# Patient Record
Sex: MALE | Race: WHITE | Employment: OTHER | URBAN - NONMETROPOLITAN AREA
[De-identification: names, ages, dates, MRNs, and addresses within clinical notes are randomized per-mention and may not be internally consistent; named-entity substitution may affect disease eponyms.]

---

## 2017-03-23 ENCOUNTER — HOSPITAL ENCOUNTER (OUTPATIENT)
Dept: PHYSICAL THERAPY | Age: 67
Setting detail: THERAPIES SERIES
Discharge: HOME | End: 2017-03-23
Payer: COMMERCIAL

## 2017-03-23 PROCEDURE — 97140 MANUAL THERAPY 1/> REGIONS: CPT

## 2017-03-23 PROCEDURE — 97163 PT EVAL HIGH COMPLEX 45 MIN: CPT

## 2017-03-23 PROCEDURE — 97110 THERAPEUTIC EXERCISES: CPT

## 2017-03-27 ENCOUNTER — HOSPITAL ENCOUNTER (OUTPATIENT)
Dept: PHYSICAL THERAPY | Age: 67
Setting detail: THERAPIES SERIES
Discharge: HOME | End: 2017-03-27
Payer: COMMERCIAL

## 2017-03-27 ENCOUNTER — HOSPITAL ENCOUNTER (OUTPATIENT)
Age: 67
Discharge: HOME | End: 2017-03-27
Payer: COMMERCIAL

## 2017-03-27 ENCOUNTER — HOSPITAL ENCOUNTER (OUTPATIENT)
Dept: GENERAL RADIOLOGY | Age: 67
Discharge: HOME | End: 2017-03-27
Payer: COMMERCIAL

## 2017-03-27 DIAGNOSIS — M54.2 NECK PAIN: ICD-10-CM

## 2017-03-27 PROCEDURE — 97110 THERAPEUTIC EXERCISES: CPT

## 2017-03-27 PROCEDURE — 97140 MANUAL THERAPY 1/> REGIONS: CPT

## 2017-03-27 PROCEDURE — 72040 X-RAY EXAM NECK SPINE 2-3 VW: CPT

## 2017-03-27 ASSESSMENT — PAIN SCALES - GENERAL: PAINLEVEL_OUTOF10: 2

## 2017-03-30 ENCOUNTER — HOSPITAL ENCOUNTER (OUTPATIENT)
Dept: PHYSICAL THERAPY | Age: 67
Setting detail: THERAPIES SERIES
Discharge: HOME | End: 2017-03-30
Payer: COMMERCIAL

## 2017-03-30 PROCEDURE — 97110 THERAPEUTIC EXERCISES: CPT

## 2017-03-30 PROCEDURE — 97140 MANUAL THERAPY 1/> REGIONS: CPT

## 2017-04-03 ENCOUNTER — HOSPITAL ENCOUNTER (OUTPATIENT)
Dept: PHYSICAL THERAPY | Age: 67
Setting detail: THERAPIES SERIES
Discharge: HOME | End: 2017-04-03
Payer: COMMERCIAL

## 2017-04-03 PROCEDURE — 97110 THERAPEUTIC EXERCISES: CPT

## 2017-04-03 PROCEDURE — 97140 MANUAL THERAPY 1/> REGIONS: CPT

## 2017-04-03 ASSESSMENT — PAIN SCALES - GENERAL: PAINLEVEL_OUTOF10: 3

## 2017-04-05 ENCOUNTER — HOSPITAL ENCOUNTER (OUTPATIENT)
Dept: PHYSICAL THERAPY | Age: 67
Setting detail: THERAPIES SERIES
Discharge: HOME | End: 2017-04-05
Payer: COMMERCIAL

## 2017-04-05 PROCEDURE — 97140 MANUAL THERAPY 1/> REGIONS: CPT

## 2017-04-05 PROCEDURE — 97110 THERAPEUTIC EXERCISES: CPT

## 2017-04-05 PROCEDURE — G0283 ELEC STIM OTHER THAN WOUND: HCPCS

## 2017-04-07 ENCOUNTER — HOSPITAL ENCOUNTER (OUTPATIENT)
Dept: PHYSICAL THERAPY | Age: 67
Setting detail: THERAPIES SERIES
Discharge: HOME | End: 2017-04-07
Payer: COMMERCIAL

## 2017-04-07 PROCEDURE — 97140 MANUAL THERAPY 1/> REGIONS: CPT

## 2017-04-07 PROCEDURE — 97110 THERAPEUTIC EXERCISES: CPT

## 2017-04-10 ENCOUNTER — HOSPITAL ENCOUNTER (OUTPATIENT)
Dept: PHYSICAL THERAPY | Age: 67
Setting detail: THERAPIES SERIES
Discharge: HOME | End: 2017-04-10
Payer: COMMERCIAL

## 2017-04-10 PROCEDURE — 97140 MANUAL THERAPY 1/> REGIONS: CPT

## 2017-04-10 PROCEDURE — 97110 THERAPEUTIC EXERCISES: CPT

## 2017-04-10 ASSESSMENT — PAIN SCALES - GENERAL: PAINLEVEL_OUTOF10: 2

## 2017-04-12 ENCOUNTER — HOSPITAL ENCOUNTER (OUTPATIENT)
Dept: PHYSICAL THERAPY | Age: 67
Setting detail: THERAPIES SERIES
Discharge: HOME | End: 2017-04-12
Payer: COMMERCIAL

## 2017-04-12 PROCEDURE — 97140 MANUAL THERAPY 1/> REGIONS: CPT

## 2017-04-12 PROCEDURE — 97110 THERAPEUTIC EXERCISES: CPT

## 2017-04-12 ASSESSMENT — PAIN SCALES - GENERAL: PAINLEVEL_OUTOF10: 2

## 2017-04-14 ENCOUNTER — HOSPITAL ENCOUNTER (OUTPATIENT)
Dept: PHYSICAL THERAPY | Age: 67
Setting detail: THERAPIES SERIES
Discharge: HOME | End: 2017-04-14
Payer: COMMERCIAL

## 2017-04-14 PROCEDURE — 97140 MANUAL THERAPY 1/> REGIONS: CPT

## 2017-04-14 PROCEDURE — 97110 THERAPEUTIC EXERCISES: CPT

## 2017-04-17 ENCOUNTER — HOSPITAL ENCOUNTER (OUTPATIENT)
Dept: PHYSICAL THERAPY | Age: 67
Setting detail: THERAPIES SERIES
Discharge: HOME | End: 2017-04-17
Payer: COMMERCIAL

## 2017-04-17 PROCEDURE — 97110 THERAPEUTIC EXERCISES: CPT

## 2017-04-17 PROCEDURE — 97140 MANUAL THERAPY 1/> REGIONS: CPT

## 2017-04-19 ENCOUNTER — HOSPITAL ENCOUNTER (OUTPATIENT)
Dept: PHYSICAL THERAPY | Age: 67
Setting detail: THERAPIES SERIES
Discharge: HOME | End: 2017-04-19
Payer: COMMERCIAL

## 2017-04-19 PROCEDURE — 97140 MANUAL THERAPY 1/> REGIONS: CPT

## 2017-04-19 PROCEDURE — 97110 THERAPEUTIC EXERCISES: CPT

## 2017-04-19 ASSESSMENT — PAIN SCALES - GENERAL: PAINLEVEL_OUTOF10: 2

## 2017-04-21 ENCOUNTER — HOSPITAL ENCOUNTER (OUTPATIENT)
Dept: PHYSICAL THERAPY | Age: 67
Setting detail: THERAPIES SERIES
Discharge: HOME | End: 2017-04-21
Payer: COMMERCIAL

## 2017-04-21 PROCEDURE — 97110 THERAPEUTIC EXERCISES: CPT

## 2017-04-24 ENCOUNTER — HOSPITAL ENCOUNTER (OUTPATIENT)
Dept: PHYSICAL THERAPY | Age: 67
Setting detail: THERAPIES SERIES
Discharge: HOME | End: 2017-04-24
Payer: COMMERCIAL

## 2017-04-24 PROCEDURE — 97110 THERAPEUTIC EXERCISES: CPT

## 2017-04-24 PROCEDURE — 97140 MANUAL THERAPY 1/> REGIONS: CPT

## 2017-04-24 ASSESSMENT — PAIN SCALES - GENERAL: PAINLEVEL_OUTOF10: 2

## 2017-04-26 ENCOUNTER — HOSPITAL ENCOUNTER (OUTPATIENT)
Dept: PHYSICAL THERAPY | Age: 67
Setting detail: THERAPIES SERIES
Discharge: HOME | End: 2017-04-26
Payer: COMMERCIAL

## 2017-04-26 PROCEDURE — 97140 MANUAL THERAPY 1/> REGIONS: CPT

## 2017-04-26 PROCEDURE — 97110 THERAPEUTIC EXERCISES: CPT

## 2017-04-26 ASSESSMENT — PAIN SCALES - GENERAL
PAINLEVEL_OUTOF10: 2
PAINLEVEL_OUTOF10: 2

## 2017-04-28 ENCOUNTER — APPOINTMENT (OUTPATIENT)
Dept: PHYSICAL THERAPY | Age: 67
End: 2017-04-28
Payer: COMMERCIAL

## 2017-05-01 ENCOUNTER — HOSPITAL ENCOUNTER (OUTPATIENT)
Dept: PHYSICAL THERAPY | Age: 67
Setting detail: THERAPIES SERIES
Discharge: HOME | End: 2017-05-01
Payer: COMMERCIAL

## 2017-05-01 PROCEDURE — 97110 THERAPEUTIC EXERCISES: CPT

## 2017-05-01 PROCEDURE — 97140 MANUAL THERAPY 1/> REGIONS: CPT

## 2017-05-03 ENCOUNTER — HOSPITAL ENCOUNTER (OUTPATIENT)
Dept: PHYSICAL THERAPY | Age: 67
Setting detail: THERAPIES SERIES
Discharge: HOME | End: 2017-05-03
Payer: COMMERCIAL

## 2017-05-03 PROCEDURE — 97110 THERAPEUTIC EXERCISES: CPT

## 2017-05-03 PROCEDURE — 97140 MANUAL THERAPY 1/> REGIONS: CPT

## 2017-05-03 ASSESSMENT — PAIN SCALES - GENERAL: PAINLEVEL_OUTOF10: 2

## 2017-05-05 ENCOUNTER — APPOINTMENT (OUTPATIENT)
Dept: PHYSICAL THERAPY | Age: 67
End: 2017-05-05
Payer: COMMERCIAL

## 2017-05-08 ENCOUNTER — HOSPITAL ENCOUNTER (OUTPATIENT)
Dept: PHYSICAL THERAPY | Age: 67
Setting detail: THERAPIES SERIES
Discharge: HOME | End: 2017-05-08
Payer: COMMERCIAL

## 2017-05-10 ENCOUNTER — APPOINTMENT (OUTPATIENT)
Dept: PHYSICAL THERAPY | Age: 67
End: 2017-05-10
Payer: COMMERCIAL

## 2017-05-19 ENCOUNTER — HOSPITAL ENCOUNTER (OUTPATIENT)
Dept: PHYSICAL THERAPY | Age: 67
Discharge: HOME | End: 2017-05-19

## 2017-12-27 NOTE — DISCHARGE SUMMARY
Phone: Dian          Fax: [phone]                            Outpatient Physical Therapy                                                                    Discharge Summary    Patient: Claudette Zuniga  : 1950  CSN #: [id number]   Referring physician: No admitting provider for patient encounter. Referring Practitioner: Omar Martinez, [facility]      Diagnosis: Cervicalgia, cervical radiculopathy      Date Treatment Initiated: 3/23/17  Date of Last Treatment: 5/3/17      PT Visit Information  Onset Date: 17  PT Insurance Information: R  Total # of Visits Approved: 18  Total # of Visits to Date: 15  Plan of Care/Certification Expiration Date: 17  No Show: 0  Canceled Appointment: 0      Frequency/Duration   3 times per week   6 weeks      Treatment Received  [x]HP/CP      []Electrical Stim   [x]Therapeutic Exercise      []Gait Training  []Aquatics   []Ultrasound         [x]Patient Education/HEP   [x]Manual Therapy  []Traction    []Neuro-shira        [x]Soft Tissue Mobs            []Home TENS  []Iontophoresis    []Orthotic casting/fitting      []Dry Needling    Assessment  Assessment: Pt completed 15 PT sessions for neck pain. At time of last visit, pain 1-2/10. UE strength was 4+/5. Pt was compliant with home program and scheduled no further visits; we will now discharge PT episode. Goals  Short term goals  Time Frame for Short term goals: 3 weeks  Short term goal 1: Pt to be instructed in home program. - met  Short term goal 2: Pt to initiated on postural strengthening program to promote proper posture and limit forward head. - met    Long term goals  Time Frame for Long term goals : 6 weeks  Long term goal 1: Pt to report independence and compliance with home program.   Long term goal 2: Pt to achieve 60 degrees of bilateral c-spine rotation to assist with activities such as driving.    Long term goal 3: Pt to demonstrate 4+/5 strength bilateral UE's to assist with lifting and carrying. Long term goal 4: Pt to report decrease UE sx's by 50-75% throughout the day.        Reason for Discharge  [x] Goals Achieved                       [] Poor Follow Through/Attendance                  [x] Optimal Function Achieved     [] Patient Discharged Self    [] Hospitalization                         [] Physician discharge      Thank you for this referral      Aditi Chun, PT, DPT                    Date: 6/27/2017

## 2018-01-16 ENCOUNTER — HOSPITAL ENCOUNTER (OUTPATIENT)
Dept: MRI IMAGING | Age: 68
Discharge: HOME | End: 2018-01-16
Payer: MEDICARE

## 2018-01-16 ENCOUNTER — HOSPITAL ENCOUNTER (OUTPATIENT)
Dept: LAB | Age: 68
Discharge: HOME | End: 2018-01-16
Payer: MEDICARE

## 2018-01-16 DIAGNOSIS — H91.90 HEARING LOSS, UNSPECIFIED HEARING LOSS TYPE, UNSPECIFIED LATERALITY: ICD-10-CM

## 2018-01-16 DIAGNOSIS — Z01.812 PRE-PROCEDURE LAB EXAM: ICD-10-CM

## 2018-01-16 DIAGNOSIS — E78.5 HYPERLIPIDEMIA, UNSPECIFIED HYPERLIPIDEMIA TYPE: ICD-10-CM

## 2018-01-16 DIAGNOSIS — Z12.5 SCREENING PSA (PROSTATE SPECIFIC ANTIGEN): ICD-10-CM

## 2018-01-16 LAB
BUN BLDV-MCNC: 15 MG/DL (ref 8–23)
CHOLESTEROL, FASTING: 167 MG/DL
CHOLESTEROL/HDL RATIO: 4
CREAT SERPL-MCNC: 0.93 MG/DL (ref 0.7–1.2)
GFR AFRICAN AMERICAN: >60 ML/MIN
GFR NON-AFRICAN AMERICAN: >60 ML/MIN
GFR SERPL CREATININE-BSD FRML MDRD: NORMAL ML/MIN/{1.73_M2}
GFR SERPL CREATININE-BSD FRML MDRD: NORMAL ML/MIN/{1.73_M2}
HDLC SERPL-MCNC: 42 MG/DL
LDL CHOLESTEROL: 97 MG/DL (ref 0–130)
PROSTATE SPECIFIC ANTIGEN: 0.54 UG/L
TRIGLYCERIDE, FASTING: 141 MG/DL
VLDLC SERPL CALC-MCNC: NORMAL MG/DL (ref 1–30)

## 2018-01-16 PROCEDURE — 84520 ASSAY OF UREA NITROGEN: CPT

## 2018-01-16 PROCEDURE — A9579 GAD-BASE MR CONTRAST NOS,1ML: HCPCS | Performed by: INTERNAL MEDICINE

## 2018-01-16 PROCEDURE — 80061 LIPID PANEL: CPT

## 2018-01-16 PROCEDURE — G0103 PSA SCREENING: HCPCS

## 2018-01-16 PROCEDURE — 6360000004 HC RX CONTRAST MEDICATION: Performed by: INTERNAL MEDICINE

## 2018-01-16 PROCEDURE — 70553 MRI BRAIN STEM W/O & W/DYE: CPT

## 2018-01-16 PROCEDURE — 82565 ASSAY OF CREATININE: CPT

## 2018-01-16 PROCEDURE — 36415 COLL VENOUS BLD VENIPUNCTURE: CPT

## 2018-01-16 RX ADMIN — GADOTERIDOL 18 ML: 279.3 INJECTION, SOLUTION INTRAVENOUS at 09:01

## 2020-05-05 PROBLEM — E66.01 MORBIDLY OBESE (HCC): Status: ACTIVE | Noted: 2020-05-05

## 2022-04-22 PROBLEM — N52.9 IMPOTENCE OF ORGANIC ORIGIN: Status: ACTIVE | Noted: 2022-04-22

## 2022-04-22 PROBLEM — N30.10 CHRONIC INTERSTITIAL CYSTITIS: Status: ACTIVE | Noted: 2022-04-22

## 2022-04-22 PROBLEM — N40.0 ENLARGED PROSTATE: Status: ACTIVE | Noted: 2022-04-22

## 2022-04-26 ENCOUNTER — HOSPITAL ENCOUNTER (OUTPATIENT)
Age: 72
Discharge: HOME | End: 2022-04-26
Payer: MEDICARE

## 2022-04-26 DIAGNOSIS — G47.00 INSOMNIA, UNSPECIFIED TYPE: ICD-10-CM

## 2022-04-26 DIAGNOSIS — I10 ESSENTIAL HYPERTENSION: ICD-10-CM

## 2022-04-26 DIAGNOSIS — R79.9 ABNORMAL FINDING OF BLOOD CHEMISTRY, UNSPECIFIED: ICD-10-CM

## 2022-04-26 DIAGNOSIS — E78.5 HYPERLIPIDEMIA, UNSPECIFIED HYPERLIPIDEMIA TYPE: ICD-10-CM

## 2022-04-26 LAB
AMPHETAMINE SCREEN URINE: NEGATIVE
BARBITURATE SCREEN URINE: NEGATIVE
BENZODIAZEPINE SCREEN, URINE: NEGATIVE
BUPRENORPHINE URINE: NEGATIVE
CANNABINOID SCREEN URINE: NEGATIVE
COCAINE METABOLITE, URINE: NEGATIVE
METHADONE SCREEN, URINE: NEGATIVE
METHAMPHETAMINE, URINE: NEGATIVE
OPIATES, URINE: NEGATIVE
OXYCODONE SCREEN URINE: NEGATIVE
PHENCYCLIDINE, URINE: NEGATIVE
PROPOXYPHENE, URINE: NEGATIVE
TRICYCLIC ANTIDEPRESSANTS, UR: NEGATIVE

## 2022-04-26 PROCEDURE — 80306 DRUG TEST PRSMV INSTRMNT: CPT

## 2022-06-02 ENCOUNTER — TELEPHONE (OUTPATIENT)
Dept: SURGERY | Age: 72
End: 2022-06-02

## 2022-06-02 DIAGNOSIS — Z12.11 SCREEN FOR COLON CANCER: Primary | ICD-10-CM

## 2022-10-24 ENCOUNTER — TELEPHONE (OUTPATIENT)
Dept: GASTROENTEROLOGY | Age: 72
End: 2022-10-24

## 2022-10-24 ENCOUNTER — OFFICE VISIT (OUTPATIENT)
Dept: GASTROENTEROLOGY | Age: 72
End: 2022-10-24
Payer: MEDICARE

## 2022-10-24 VITALS
WEIGHT: 210.8 LBS | DIASTOLIC BLOOD PRESSURE: 78 MMHG | BODY MASS INDEX: 35.99 KG/M2 | TEMPERATURE: 97.7 F | RESPIRATION RATE: 18 BRPM | HEART RATE: 76 BPM | HEIGHT: 64 IN | SYSTOLIC BLOOD PRESSURE: 132 MMHG

## 2022-10-24 DIAGNOSIS — K21.9 GASTROESOPHAGEAL REFLUX DISEASE WITHOUT ESOPHAGITIS: ICD-10-CM

## 2022-10-24 DIAGNOSIS — Z01.818 PRE-OP TESTING: Primary | ICD-10-CM

## 2022-10-24 DIAGNOSIS — Z12.11 COLON CANCER SCREENING: Primary | ICD-10-CM

## 2022-10-24 PROCEDURE — G8427 DOCREV CUR MEDS BY ELIG CLIN: HCPCS | Performed by: INTERNAL MEDICINE

## 2022-10-24 PROCEDURE — 1036F TOBACCO NON-USER: CPT | Performed by: INTERNAL MEDICINE

## 2022-10-24 PROCEDURE — 99202 OFFICE O/P NEW SF 15 MIN: CPT | Performed by: INTERNAL MEDICINE

## 2022-10-24 PROCEDURE — G8417 CALC BMI ABV UP PARAM F/U: HCPCS | Performed by: INTERNAL MEDICINE

## 2022-10-24 PROCEDURE — 3017F COLORECTAL CA SCREEN DOC REV: CPT | Performed by: INTERNAL MEDICINE

## 2022-10-24 PROCEDURE — G8484 FLU IMMUNIZE NO ADMIN: HCPCS | Performed by: INTERNAL MEDICINE

## 2022-10-24 PROCEDURE — 1123F ACP DISCUSS/DSCN MKR DOCD: CPT | Performed by: INTERNAL MEDICINE

## 2022-10-24 PROCEDURE — 99215 OFFICE O/P EST HI 40 MIN: CPT | Performed by: INTERNAL MEDICINE

## 2022-10-24 RX ORDER — POLYETHYLENE GLYCOL 3350, SODIUM CHLORIDE, SODIUM BICARBONATE, POTASSIUM CHLORIDE 420; 11.2; 5.72; 1.48 G/4L; G/4L; G/4L; G/4L
4000 POWDER, FOR SOLUTION ORAL ONCE
Qty: 4000 ML | Refills: 0 | Status: SHIPPED | OUTPATIENT
Start: 2022-10-24 | End: 2022-10-24

## 2022-10-24 ASSESSMENT — ENCOUNTER SYMPTOMS
RESPIRATORY NEGATIVE: 1
GASTROINTESTINAL NEGATIVE: 1
ROS SKIN COMMENTS: BASAL CELL CARCINOMA
EYE REDNESS: 0

## 2022-10-24 NOTE — PROGRESS NOTES
[address]  [address]    No chief complaint on file. HPI    Mr. Meme Chester is a 81year old man with a past medical history of hypertension, hyperlipidemia, acid reflux who presents for colon cancer screening. His last EGD and colonoscopy was in 06/2012 with findings of  hyperplastic polyps and recommendations for follow-up in 10 years. Family history of colon cancer: Mother in her [de-identified]  Blood in stool: No  Unintentional weight loss: No  Abdominal pain: No  Prior colonoscopy: Yes  Constipation history: No  Number of bowel movements a day:1-2  Change in stool caliber: No    EGD/Colonoscopy 06/25/2012  A. DUODENAL BULB, BIOPSY:            -  DUODENAL MUCOSA WITH TRANSITIONAL ZONE                  EPITHELIUM WITH MODERATE CHRONIC INFLAMMATION. B.     SIGMOID POLYP:            -  HYPERPLASTIC POLYP OF LARGE INTESTINE. C.     RECTAL POLYP:            -  HYPERPLASTIC POLYP OF LARGE INTESTINE. Past Medical History:   Diagnosis Date    Cancer ([facility] [address]     skin    GERD (gastroesophageal reflux disease)     Hearing loss     High cholesterol     Hypertension     Stroke [facility])     Unspecified cerebral artery occlusion with cerebral infarction          Past Surgical History:   Procedure Laterality Date    PROSTATE SURGERY      SKIN CANCER DESTRUCTION  2008    SKIN CANCER EXCISION  2008         Current Outpatient Medications   Medication Sig Dispense Refill    losartan (COZAAR) 100 MG tablet TAKE 1 TABLET EVERY DAY 90 tablet 1    amLODIPine (NORVASC) 10 MG tablet TAKE 1 TABLET EVERY DAY 90 tablet 1    atorvastatin (LIPITOR) 20 MG tablet TAKE 1 TABLET EVERY DAY 90 tablet 1    aspirin 81 MG tablet Take 81 mg by mouth daily. dutasteride (AVODART) 0.5 MG capsule Take 0.5 mg by mouth three times a week. No current facility-administered medications for this visit.          Family History   Problem Relation Age of Onset    Colon Cancer Mother     Alzheimer's Disease Mother Heart Disease Father     Heart Disease Brother     Alzheimer's Disease Maternal Grandmother     Cancer Maternal Grandfather         prostate          Social Determinants of Health     Tobacco Use: Medium Risk    Smoking Tobacco Use: Former    Smokeless Tobacco Use: Former    Passive Exposure: Not on file   Alcohol Use: Not At Risk    Frequency of Alcohol Consumption: 4 or more times a week    Average Number of Drinks: 1 or 2    Frequency of Binge Drinking: Never   Financial Resource Strain: Low Risk     Difficulty of Paying Living Expenses: Not hard at all   Food Insecurity: No Food Insecurity    Worried About Running Out of Food in the Last Year: Never true    [address] in the Last Year: Never true   Transportation Needs: No Transportation Needs    Lack of Transportation (Medical): No    Lack of Transportation (Non-Medical): No   Physical Activity: Insufficiently Active    Days of Exercise per Week: 4 days    Minutes of Exercise per Session: 30 min   Stress: No Stress Concern Present    Feeling of Stress : Not at all   Social Connections: Socially Integrated    Frequency of Communication with Friends and Family: Three times a week    Frequency of Social Gatherings with Friends and Family: More than three times a week    Attends [employer] Services: More than 4 times per year    Active Member of [employer] or Organizations: Yes    Attends Club or Organization Meetings: More than 4 times per year    Marital Status:    Intimate Partner Violence: Not At Risk    Fear of Current or Ex-Partner: No    Emotionally Abused: No    Physically Abused: No    Sexually Abused: No   Depression: Not at risk    PHQ-2 Score: 0   Housing Stability: Not on file       Review of Systems   Constitutional: Negative. HENT: Negative. Eyes:  Negative for redness. Respiratory: Negative. Cardiovascular:         Hypertension   Gastrointestinal: Negative. Endocrine:        Hyperlipidemia   Genitourinary: Negative. BPH     Musculoskeletal: Negative. Skin:         Basal cell carcinoma   Neurological: Negative. Hematological: Negative. Psychiatric/Behavioral: Negative. Vitals:    10/24/22 0829   BP: 132/78   Pulse: 76   Resp: 18   Temp: 97.7 °F (36.5 °C)         Physical Exam  Constitutional:       Appearance: Normal appearance. HENT:      Head: Normocephalic and atraumatic. Right Ear: External ear normal.      Left Ear: External ear normal.      Nose: Nose normal.      Mouth/Throat:      Mouth: Mucous membranes are moist.   Eyes:      General: No scleral icterus. Extraocular Movements: Extraocular movements intact. Pupils: Pupils are equal, round, and reactive to light. Cardiovascular:      Rate and Rhythm: Normal rate and regular rhythm. Pulses: Normal pulses. Heart sounds: Normal heart sounds. Pulmonary:      Effort: Pulmonary effort is normal.      Breath sounds: Normal breath sounds. Abdominal:      General: Bowel sounds are normal.      Palpations: Abdomen is soft. Musculoskeletal:         General: Normal range of motion. Cervical back: Normal range of motion and neck supple. Skin:     General: Skin is warm. Neurological:      General: No focal deficit present. Mental Status: He is alert.    Psychiatric:         Mood and Affect: Mood normal.       Lab Results   Component Value Date    WBC 6.8 05/04/2022    HGB 15.3 05/04/2022    HCT 44.4 05/04/2022    MCV 90 05/04/2022     05/04/2022        Lab Results   Component Value Date     05/04/2022    K 4.0 05/04/2022     05/04/2022    CO2 28 05/04/2022    BUN 18 05/04/2022    CREATININE 0.96 05/04/2022    GLUCOSE 101 (H) 05/04/2022    CALCIUM 9.6 05/04/2022    PROT 7.1 05/04/2022    LABALBU 4.5 05/04/2022    BILITOT 0.5 05/04/2022    ALKPHOS 42 05/04/2022    AST 31 05/04/2022    ALT 47 (H) 05/04/2022    LABGLOM >60 01/16/2018    GFRAA >60 01/16/2018        No results found for: INR, PROTIME      Assessment    Mr. Ebony Alicea is a 81year old man with a PMH of hypertension, hyperlipidemia, acid reflux who presents for colon cancer screening. His last EGD and colonoscopy was in 06/2012 with findings of  hyperplastic polyps and recommendations for follow-up in 10 years. ASA 2, Mallampati score 2. Plan    1. Colon cancer screening  - COLONOSCOPY (Screening); Future  - EGD; Future  - polyethylene glycol-electrolytes (NULYTELY) 420 g solution; Take 4,000 mLs by mouth once for 1 dose  Dispense: 4000 mL; Refill: 0    2. Gastroesophageal reflux disease without esophagitis  - COLONOSCOPY (Screening); Future  - EGD; Future  - polyethylene glycol-electrolytes (NULYTELY) 420 g solution; Take 4,000 mLs by mouth once for 1 dose  Dispense: 4000 mL; Refill: 0    3. Additional recommendations based on findings. Informed consent was obtained with a discussion about potential risks and complications of the procedure. Patient verbalized understanding and willingness to continue with the procedure scheduling. Spent 20 minutes with the patient with greater than 50 percent of the time was spent on face-to-face time in discussion with the patient regarding diagnostic options/results, treatment options, counseling, and follow-up plan.       Elier Pearson MD

## 2022-10-24 NOTE — PATIENT INSTRUCTIONS
SURVEY:    You may be receiving a survey from [employer] regarding your visit today. Please complete the survey to enable us to provide the highest quality of care to you and your family. If you cannot score us a very good on any question, please call the office to discuss how we could have made your experience a very good one. Thank you.

## 2022-10-28 ENCOUNTER — HOSPITAL ENCOUNTER (OUTPATIENT)
Age: 72
Setting detail: SPECIMEN
Discharge: HOME | End: 2022-10-28

## 2022-10-28 DIAGNOSIS — I10 ESSENTIAL HYPERTENSION: ICD-10-CM

## 2022-10-28 LAB
ALBUMIN SERPL-MCNC: 4.6 G/DL (ref 3.5–5.2)
ALBUMIN/GLOBULIN RATIO: 1.6 (ref 1–2.5)
ALP BLD-CCNC: 46 U/L (ref 40–129)
ALT SERPL-CCNC: 43 U/L (ref 5–41)
ANION GAP SERPL CALCULATED.3IONS-SCNC: 13 MMOL/L (ref 9–17)
AST SERPL-CCNC: 32 U/L
BILIRUB SERPL-MCNC: 0.3 MG/DL (ref 0.3–1.2)
BUN BLDV-MCNC: 11 MG/DL (ref 8–23)
CALCIUM SERPL-MCNC: 9.8 MG/DL (ref 8.6–10.4)
CHLORIDE BLD-SCNC: 100 MMOL/L (ref 98–107)
CO2: 27 MMOL/L (ref 20–31)
CREAT SERPL-MCNC: 0.86 MG/DL (ref 0.7–1.2)
GFR SERPL CREATININE-BSD FRML MDRD: >60 ML/MIN/1.73M2
GLUCOSE BLD-MCNC: 104 MG/DL (ref 70–99)
POTASSIUM SERPL-SCNC: 4.2 MMOL/L (ref 3.7–5.3)
SODIUM BLD-SCNC: 140 MMOL/L (ref 135–144)
TOTAL PROTEIN: 7.5 G/DL (ref 6.4–8.3)

## 2023-02-23 RX ORDER — M-VIT,TX,IRON,MINS/CALC/FOLIC 27MG-0.4MG
1 TABLET ORAL DAILY
COMMUNITY

## 2023-02-23 NOTE — PROGRESS NOTES
Patient instructed on the pre-operative, intra-operative, and post-operative process. Patient's surgical procedure and day of surgery confirmed. Patient instructed on NPO status. Medication instructions reviewed with patient per PAT phone interview.

## 2023-02-23 NOTE — PROGRESS NOTES
Patient instructed to contact [facility] office regarding not having received colonoscopy prep instructions; instructed patient to have ordered EKG done as soon as possible, patient states he will have EKG done on March 1st.

## 2023-03-01 ENCOUNTER — HOSPITAL ENCOUNTER (OUTPATIENT)
Age: 73
Discharge: HOME | End: 2023-03-01
Payer: MEDICARE

## 2023-03-01 ENCOUNTER — ANESTHESIA EVENT (OUTPATIENT)
Dept: OPERATING ROOM | Age: 73
End: 2023-03-01
Payer: MEDICARE

## 2023-03-01 DIAGNOSIS — Z01.818 PRE-OP TESTING: ICD-10-CM

## 2023-03-01 PROCEDURE — 93005 ELECTROCARDIOGRAM TRACING: CPT

## 2023-03-02 LAB
EKG ATRIAL RATE: 72 BPM
EKG P AXIS: 69 DEGREES
EKG P-R INTERVAL: 212 MS
EKG Q-T INTERVAL: 384 MS
EKG QRS DURATION: 84 MS
EKG QTC CALCULATION (BAZETT): 420 MS
EKG R AXIS: -10 DEGREES
EKG T AXIS: 32 DEGREES
EKG VENTRICULAR RATE: 72 BPM

## 2023-03-08 ENCOUNTER — ANESTHESIA (OUTPATIENT)
Dept: OPERATING ROOM | Age: 73
End: 2023-03-08
Payer: MEDICARE

## 2023-03-08 ENCOUNTER — HOSPITAL ENCOUNTER (OUTPATIENT)
Age: 73
Setting detail: OBSERVATION
Discharge: HOME | End: 2023-03-09
Attending: INTERNAL MEDICINE | Admitting: INTERNAL MEDICINE
Payer: MEDICARE

## 2023-03-08 DIAGNOSIS — K21.00 GASTROESOPHAGEAL REFLUX DISEASE WITH ESOPHAGITIS, UNSPECIFIED WHETHER HEMORRHAGE: ICD-10-CM

## 2023-03-08 DIAGNOSIS — Z12.11 SCREENING FOR COLON CANCER: ICD-10-CM

## 2023-03-08 PROBLEM — K25.3 CAMERON LESION, ACUTE: Status: ACTIVE | Noted: 2023-03-08

## 2023-03-08 PROBLEM — K29.90 GASTRITIS AND DUODENITIS: Status: ACTIVE | Noted: 2023-03-08

## 2023-03-08 PROBLEM — I45.9 HEART BLOCK: Status: ACTIVE | Noted: 2023-03-08

## 2023-03-08 PROBLEM — K26.3: Status: ACTIVE | Noted: 2023-03-08

## 2023-03-08 PROBLEM — K63.5 POLYP OF COLON: Status: ACTIVE | Noted: 2023-03-08

## 2023-03-08 PROBLEM — K44.9 HIATAL HERNIA: Status: ACTIVE | Noted: 2023-03-08

## 2023-03-08 LAB
EKG ATRIAL RATE: 63 BPM
EKG P AXIS: 63 DEGREES
EKG P-R INTERVAL: 214 MS
EKG Q-T INTERVAL: 422 MS
EKG QRS DURATION: 78 MS
EKG QTC CALCULATION (BAZETT): 431 MS
EKG R AXIS: -19 DEGREES
EKG T AXIS: 15 DEGREES
EKG VENTRICULAR RATE: 63 BPM

## 2023-03-08 PROCEDURE — 88305 TISSUE EXAM BY PATHOLOGIST: CPT

## 2023-03-08 PROCEDURE — 6360000002 HC RX W HCPCS: Performed by: INTERNAL MEDICINE

## 2023-03-08 PROCEDURE — 6360000002 HC RX W HCPCS

## 2023-03-08 PROCEDURE — 7100000010 HC PHASE II RECOVERY - FIRST 15 MIN: Performed by: INTERNAL MEDICINE

## 2023-03-08 PROCEDURE — 93010 ELECTROCARDIOGRAM REPORT: CPT | Performed by: INTERNAL MEDICINE

## 2023-03-08 PROCEDURE — 3700000001 HC ADD 15 MINUTES (ANESTHESIA): Performed by: INTERNAL MEDICINE

## 2023-03-08 PROCEDURE — 6370000000 HC RX 637 (ALT 250 FOR IP): Performed by: INTERNAL MEDICINE

## 2023-03-08 PROCEDURE — 7100000011 HC PHASE II RECOVERY - ADDTL 15 MIN: Performed by: INTERNAL MEDICINE

## 2023-03-08 PROCEDURE — 3700000000 HC ANESTHESIA ATTENDED CARE: Performed by: INTERNAL MEDICINE

## 2023-03-08 PROCEDURE — 2500000003 HC RX 250 WO HCPCS

## 2023-03-08 PROCEDURE — 94761 N-INVAS EAR/PLS OXIMETRY MLT: CPT

## 2023-03-08 PROCEDURE — 2580000003 HC RX 258: Performed by: NURSE ANESTHETIST, CERTIFIED REGISTERED

## 2023-03-08 PROCEDURE — 93005 ELECTROCARDIOGRAM TRACING: CPT | Performed by: INTERNAL MEDICINE

## 2023-03-08 PROCEDURE — 3609012400 HC EGD TRANSORAL BIOPSY SINGLE/MULTIPLE: Performed by: INTERNAL MEDICINE

## 2023-03-08 PROCEDURE — G0378 HOSPITAL OBSERVATION PER HR: HCPCS

## 2023-03-08 PROCEDURE — 2709999900 HC NON-CHARGEABLE SUPPLY: Performed by: INTERNAL MEDICINE

## 2023-03-08 PROCEDURE — 2580000003 HC RX 258: Performed by: INTERNAL MEDICINE

## 2023-03-08 PROCEDURE — 3609010700 HC COLONOSCOPY POLYPECTOMY REMOVAL SNARE/STOMA: Performed by: INTERNAL MEDICINE

## 2023-03-08 PROCEDURE — 96372 THER/PROPH/DIAG INJ SC/IM: CPT

## 2023-03-08 RX ORDER — PROPOFOL 10 MG/ML
INJECTION, EMULSION INTRAVENOUS PRN
Status: DISCONTINUED | OUTPATIENT
Start: 2023-03-08 | End: 2023-03-08 | Stop reason: SDUPTHER

## 2023-03-08 RX ORDER — PROPOFOL 10 MG/ML
INJECTION, EMULSION INTRAVENOUS PRN
Status: DISCONTINUED | OUTPATIENT
Start: 2023-03-08 | End: 2023-03-08

## 2023-03-08 RX ORDER — SODIUM CHLORIDE, SODIUM LACTATE, POTASSIUM CHLORIDE, CALCIUM CHLORIDE 600; 310; 30; 20 MG/100ML; MG/100ML; MG/100ML; MG/100ML
INJECTION, SOLUTION INTRAVENOUS CONTINUOUS
Status: DISCONTINUED | OUTPATIENT
Start: 2023-03-08 | End: 2023-03-08

## 2023-03-08 RX ORDER — LOSARTAN POTASSIUM 50 MG/1
100 TABLET ORAL DAILY
Status: DISCONTINUED | OUTPATIENT
Start: 2023-03-08 | End: 2023-03-09 | Stop reason: HOSPADM

## 2023-03-08 RX ORDER — ONDANSETRON 4 MG/1
4 TABLET, ORALLY DISINTEGRATING ORAL EVERY 8 HOURS PRN
Status: DISCONTINUED | OUTPATIENT
Start: 2023-03-08 | End: 2023-03-09 | Stop reason: HOSPADM

## 2023-03-08 RX ORDER — SODIUM CHLORIDE 0.9 % (FLUSH) 0.9 %
10 SYRINGE (ML) INJECTION PRN
Status: DISCONTINUED | OUTPATIENT
Start: 2023-03-08 | End: 2023-03-09 | Stop reason: HOSPADM

## 2023-03-08 RX ORDER — OMEPRAZOLE 20 MG/1
20 CAPSULE, DELAYED RELEASE ORAL
Qty: 30 CAPSULE | Refills: 2 | Status: SHIPPED | OUTPATIENT
Start: 2023-03-08 | End: 2023-03-16 | Stop reason: SDUPTHER

## 2023-03-08 RX ORDER — ACETAMINOPHEN 325 MG/1
650 TABLET ORAL EVERY 6 HOURS PRN
Status: DISCONTINUED | OUTPATIENT
Start: 2023-03-08 | End: 2023-03-09 | Stop reason: HOSPADM

## 2023-03-08 RX ORDER — POLYETHYLENE GLYCOL 3350 17 G/17G
17 POWDER, FOR SOLUTION ORAL DAILY PRN
Status: DISCONTINUED | OUTPATIENT
Start: 2023-03-08 | End: 2023-03-09 | Stop reason: HOSPADM

## 2023-03-08 RX ORDER — FINASTERIDE 5 MG/1
5 TABLET, FILM COATED ORAL
Status: DISCONTINUED | OUTPATIENT
Start: 2023-03-08 | End: 2023-03-09 | Stop reason: HOSPADM

## 2023-03-08 RX ORDER — ONDANSETRON 2 MG/ML
4 INJECTION INTRAMUSCULAR; INTRAVENOUS EVERY 6 HOURS PRN
Status: DISCONTINUED | OUTPATIENT
Start: 2023-03-08 | End: 2023-03-09 | Stop reason: HOSPADM

## 2023-03-08 RX ORDER — SODIUM CHLORIDE 9 MG/ML
INJECTION, SOLUTION INTRAVENOUS CONTINUOUS
Status: ACTIVE | OUTPATIENT
Start: 2023-03-08 | End: 2023-03-09

## 2023-03-08 RX ORDER — LIDOCAINE HYDROCHLORIDE 20 MG/ML
INJECTION, SOLUTION EPIDURAL; INFILTRATION; INTRACAUDAL; PERINEURAL PRN
Status: DISCONTINUED | OUTPATIENT
Start: 2023-03-08 | End: 2023-03-08 | Stop reason: SDUPTHER

## 2023-03-08 RX ORDER — SODIUM CHLORIDE 0.9 % (FLUSH) 0.9 %
5-40 SYRINGE (ML) INJECTION EVERY 12 HOURS SCHEDULED
Status: DISCONTINUED | OUTPATIENT
Start: 2023-03-08 | End: 2023-03-09 | Stop reason: HOSPADM

## 2023-03-08 RX ORDER — ATORVASTATIN CALCIUM 20 MG/1
20 TABLET, FILM COATED ORAL DAILY
Status: DISCONTINUED | OUTPATIENT
Start: 2023-03-08 | End: 2023-03-09 | Stop reason: HOSPADM

## 2023-03-08 RX ORDER — AMLODIPINE BESYLATE 10 MG/1
10 TABLET ORAL DAILY
Status: DISCONTINUED | OUTPATIENT
Start: 2023-03-08 | End: 2023-03-09 | Stop reason: HOSPADM

## 2023-03-08 RX ORDER — ZOLPIDEM TARTRATE 10 MG/1
10 TABLET ORAL NIGHTLY PRN
Status: DISCONTINUED | OUTPATIENT
Start: 2023-03-08 | End: 2023-03-09 | Stop reason: HOSPADM

## 2023-03-08 RX ORDER — SODIUM CHLORIDE 9 MG/ML
INJECTION, SOLUTION INTRAVENOUS PRN
Status: DISCONTINUED | OUTPATIENT
Start: 2023-03-08 | End: 2023-03-09 | Stop reason: HOSPADM

## 2023-03-08 RX ORDER — ACETAMINOPHEN 650 MG/1
650 SUPPOSITORY RECTAL EVERY 6 HOURS PRN
Status: DISCONTINUED | OUTPATIENT
Start: 2023-03-08 | End: 2023-03-09 | Stop reason: HOSPADM

## 2023-03-08 RX ORDER — ENOXAPARIN SODIUM 100 MG/ML
40 INJECTION SUBCUTANEOUS NIGHTLY
Status: DISCONTINUED | OUTPATIENT
Start: 2023-03-08 | End: 2023-03-09 | Stop reason: HOSPADM

## 2023-03-08 RX ADMIN — AMLODIPINE BESYLATE 10 MG: 10 TABLET ORAL at 19:59

## 2023-03-08 RX ADMIN — SODIUM CHLORIDE, POTASSIUM CHLORIDE, SODIUM LACTATE AND CALCIUM CHLORIDE: 600; 310; 30; 20 INJECTION, SOLUTION INTRAVENOUS at 11:09

## 2023-03-08 RX ADMIN — ENOXAPARIN SODIUM 40 MG: 100 INJECTION SUBCUTANEOUS at 21:10

## 2023-03-08 RX ADMIN — PROPOFOL 200 MCG/KG/MIN: 10 INJECTION, EMULSION INTRAVENOUS at 12:50

## 2023-03-08 RX ADMIN — SODIUM CHLORIDE, PRESERVATIVE FREE 10 ML: 5 INJECTION INTRAVENOUS at 20:01

## 2023-03-08 RX ADMIN — LOSARTAN POTASSIUM 100 MG: 50 TABLET, FILM COATED ORAL at 19:59

## 2023-03-08 RX ADMIN — FINASTERIDE 5 MG: 5 TABLET, FILM COATED ORAL at 19:59

## 2023-03-08 RX ADMIN — LIDOCAINE HYDROCHLORIDE 180 MG: 20 INJECTION, SOLUTION EPIDURAL; INFILTRATION; INTRACAUDAL; PERINEURAL at 12:49

## 2023-03-08 RX ADMIN — SODIUM CHLORIDE: 9 INJECTION, SOLUTION INTRAVENOUS at 20:06

## 2023-03-08 RX ADMIN — PROPOFOL 70 MG: 10 INJECTION, EMULSION INTRAVENOUS at 12:49

## 2023-03-08 RX ADMIN — ZOLPIDEM TARTRATE 10 MG: 10 TABLET ORAL at 21:10

## 2023-03-08 RX ADMIN — ATORVASTATIN CALCIUM 20 MG: 20 TABLET, FILM COATED ORAL at 19:59

## 2023-03-08 ASSESSMENT — PAIN - FUNCTIONAL ASSESSMENT: PAIN_FUNCTIONAL_ASSESSMENT: NONE - DENIES PAIN

## 2023-03-08 ASSESSMENT — PAIN SCALES - GENERAL: PAINLEVEL_OUTOF10: 0

## 2023-03-08 NOTE — PROGRESS NOTES
Patient made aware of Dr. Quique Mtz wanting to admit patient for observation. Patient verbalizes understanding.

## 2023-03-08 NOTE — ANESTHESIA PRE PROCEDURE
Department of Anesthesiology  Preprocedure Note       Name:  Willow Weber   Age:  72 y.o.  :  1950                                          MRN:  [id number]         Date:  3/8/2023      Surgeon: Preeti Signs):  Russella Gowers, MD    Procedure: Procedure(s):  COLORECTAL CANCER SCREENING, NOT HIGH RISK  EGD ESOPHAGOGASTRODUODENOSCOPY    Medications prior to admission:   Prior to Admission medications    Medication Sig Start Date End Date Taking? Authorizing Provider   Multiple Vitamins-Minerals (THERAPEUTIC MULTIVITAMIN-MINERALS) tablet Take 1 tablet by mouth daily   Yes Historical Provider, MD   atorvastatin (LIPITOR) 20 MG tablet Take 1 tablet by mouth daily 10/28/22   Grazyna Blanks APRN - CNP   losartan (COZAAR) 100 MG tablet TAKE 1 TABLET EVERY DAY 22   Grazyna Heather, APRN - CNP   amLODIPine (NORVASC) 10 MG tablet TAKE 1 TABLET EVERY DAY 9/15/22   Grazynamargaret Romero, APRN - CNP   aspirin 81 MG tablet Take 81 mg by mouth daily. Historical Provider, MD   dutasteride (AVODART) 0.5 MG capsule Take 0.5 mg by mouth three times a week. Historical Provider, MD       Current medications:    Current Facility-Administered Medications   Medication Dose Route Frequency Provider Last Rate Last Admin    lactated ringers IV soln infusion   IntraVENous Continuous NEHEMIAS Haq CRNA 100 mL/hr at 23 1109 New Bag at 23 1109       Allergies:     Allergies   Allergen Reactions    Pcn [Penicillins]      Unsure of reaction -- told allergy as a child       Problem List:    Patient Active Problem List   Diagnosis Code    Vertigo, intermittent R42    Meniere syndrome H81.09    Hypertension I10    Hyperlipidemia E78.5    Encounter for counseling Z71.9    Unspecified hyperplasia of prostate without urinary obstruction and other lower urinary tract symptoms (LUTS) N40.0    Insomnia G47.00    Morbidly obese (HCC) E66.01    Chronic interstitial cystitis N30.10    Enlarged prostate N40.0    Impotence of organic origin N52.9       Past Medical History:        Diagnosis Date    Cancer ([facility]     skin    GERD (gastroesophageal reflux disease)     Hearing loss     High cholesterol     Hypertension     Stroke ([facility]     Unspecified cerebral artery occlusion with cerebral infarction        Past Surgical History:        Procedure Laterality Date    COLONOSCOPY  2012    ESOPHAGOGASTRODUODENOSCOPY  2012    PROSTATE SURGERY      SKIN CANCER DESTRUCTION  01/01/2008    SKIN CANCER EXCISION  01/01/2008       Social History:    Social History     Tobacco Use    Smoking status: Former     Packs/day: 1.00     Years: 15.00     Pack years: 15.00     Types: Cigarettes     Start date: 8/20/2003    Smokeless tobacco: Never   Substance Use Topics    Alcohol use: Yes     Comment: glass of wine on ocass                                Counseling given: Not Answered      Vital Signs (Current):   Vitals:    02/23/23 1432 03/08/23 1103   BP:  (!) 160/91   Pulse:  76   Resp:  18   Temp:  36.4 °C (97.6 °F)   TempSrc:  Temporal   SpO2:  97%   Weight: 205 lb (93 kg) 207 lb (93.9 kg)   Height: 5' 6\" (1.676 m) 5' 6\" (1.676 m)                                              BP Readings from Last 3 Encounters:   03/08/23 (!) 160/91   10/28/22 130/74   10/24/22 132/78       NPO Status: Time of last liquid consumption: 0530                        Time of last solid consumption: 1800                        Date of last liquid consumption: 03/08/23                        Date of last solid food consumption: 03/06/23    BMI:   Wt Readings from Last 3 Encounters:   03/08/23 207 lb (93.9 kg)   10/28/22 211 lb (95.7 kg)   10/24/22 210 lb 12.8 oz (95.6 kg)     Body mass index is 33.41 kg/m².     CBC:   Lab Results   Component Value Date/Time    WBC 6.8 05/04/2022 08:45 AM    RBC 4.95 05/04/2022 08:45 AM    HGB 15.3 05/04/2022 08:45 AM    HCT 44.4 05/04/2022 08:45 AM    MCV 90 05/04/2022 08:45 AM    RDW 13.6 05/04/2022 08:45 AM  05/04/2022 08:45 AM       CMP:   Lab Results   Component Value Date/Time     10/28/2022 09:05 AM    K 4.2 10/28/2022 09:05 AM     10/28/2022 09:05 AM    CO2 27 10/28/2022 09:05 AM    BUN 11 10/28/2022 09:05 AM    CREATININE 0.86 10/28/2022 09:05 AM    GFRAA >60 01/16/2018 08:00 AM    LABGLOM >60 10/28/2022 09:05 AM    GLUCOSE 104 10/28/2022 09:05 AM    GLUCOSE 101 05/04/2022 08:45 AM    PROT 7.5 10/28/2022 09:05 AM    CALCIUM 9.8 10/28/2022 09:05 AM    BILITOT 0.3 10/28/2022 09:05 AM    ALKPHOS 46 10/28/2022 09:05 AM    AST 32 10/28/2022 09:05 AM    ALT 43 10/28/2022 09:05 AM       POC Tests: No results for input(s): POCGLU, POCNA, POCK, POCCL, POCBUN, POCHEMO, POCHCT in the last 72 hours.     Coags: No results found for: PROTIME, INR, APTT    HCG (If Applicable): No results found for: PREGTESTUR, PREGSERUM, HCG, HCGQUANT     ABGs: No results found for: PHART, PO2ART, AMZ9XWN, CMC8ZJV, BEART, H6LILTZQ     Type & Screen (If Applicable):  No results found for: LABABO, LABRH    Drug/Infectious Status (If Applicable):  No results found for: HIV, HEPCAB    COVID-19 Screening (If Applicable): No results found for: COVID19        Anesthesia Evaluation  Patient summary reviewed and Nursing notes reviewed no history of anesthetic complications:   Airway: Mallampati: I  TM distance: >3 FB   Neck ROM: full  Mouth opening: > = 3 FB   Dental:    (+) partials      Pulmonary:Negative Pulmonary ROS and normal exam                               Cardiovascular:  Exercise tolerance: good (>4 METS),   (+) hypertension: moderate, hyperlipidemia      ECG reviewed                     ROS comment: EKG 3/1/2023 Sinus rhythm with 1st degree A-V block  Otherwise normal ECG  When compared with ECG of 03-SEP-2003 08:43,  NV interval has increased      Neuro/Psych:   (+) CVA:,              ROS comment: never had symptoms of stroke - revealed incidentally on imaging GI/Hepatic/Renal:   (+) hiatal hernia, GERD: no interval change, bowel prep,          ROS comment: feeling of fullness at upper abdome when hiatal hernia \"acting up\". Endo/Other: Negative Endo/Other ROS             Pt had PAT visit. Abdominal:             Vascular: Other Findings:           Anesthesia Plan      general and TIVA     ASA 3             Anesthetic plan and risks discussed with patient and spouse.                         NEHEMIAS Rodriguez - CRNA   3/8/2023

## 2023-03-08 NOTE — H&P
History and Physical    Patient's Name/Date of Birth: Dario Meng / 1950 (80 y.o.)    MRN: [id number]     Date: March 8, 2023       CHIEF COMPLAINT:  screening for colon cancer      HPI     Mr. Madhav Almanzar is a 72year old man with a past medical history of hypertension, hyperlipidemia, acid reflux who presents for colon cancer screening. His last EGD and colonoscopy was in 06/2012 with findings of  hyperplastic polyps and recommendations for follow-up in 10 years. Family history of colon cancer: Mother in her [de-identified]  Blood in stool: No  Unintentional weight loss: No  Abdominal pain: No  Prior colonoscopy: Yes  Constipation history: No  Number of bowel movements a day:1-2  Change in stool caliber: No     EGD/Colonoscopy 06/25/2012  A. DUODENAL BULB, BIOPSY:            -  DUODENAL MUCOSA WITH TRANSITIONAL ZONE                  EPITHELIUM WITH MODERATE CHRONIC INFLAMMATION. B.     SIGMOID POLYP:            -  HYPERPLASTIC POLYP OF LARGE INTESTINE. C.     RECTAL POLYP:            -  HYPERPLASTIC POLYP OF LARGE INTESTINE. Past Medical History:   Diagnosis Date    Cancer ([facility] [address]     skin    GERD (gastroesophageal reflux disease)     Hearing loss     High cholesterol     Hypertension     Stroke [facility])     Unspecified cerebral artery occlusion with cerebral infarction      Past Surgical History:   Procedure Laterality Date    COLONOSCOPY  2012    ESOPHAGOGASTRODUODENOSCOPY  2012    PROSTATE SURGERY      SKIN CANCER DESTRUCTION  01/01/2008    SKIN CANCER EXCISION  01/01/2008     Current Facility-Administered Medications   Medication Dose Route Frequency Provider Last Rate Last Admin    lactated ringers IV soln infusion   IntraVENous Continuous Shante Lara - CRNA 100 mL/hr at 03/08/23 1109 New Bag at 03/08/23 1109     Allergies   Allergen Reactions    Pcn [Penicillins]      Unsure of reaction -- told allergy as a child     Family History   Problem Relation Age of Onset    Colon Cancer Mother Alzheimer's Disease Mother     Heart Disease Father     Heart Disease Brother     Alzheimer's Disease Maternal Grandmother     Cancer Maternal Grandfather         prostate     Social History     Socioeconomic History    Marital status:      Spouse name: Not on file    Number of children: Not on file    Years of education: Not on file    Highest education level: Not on file   Occupational History    Not on file   Tobacco Use    Smoking status: Former     Packs/day: 1.00     Years: 15.00     Pack years: 15.00     Types: Cigarettes     Start date: 8/20/2003    Smokeless tobacco: Never   Vaping Use    Vaping Use: Never used   Substance and Sexual Activity    Alcohol use: Yes     Comment: glass of wine on ocass    Drug use: Never    Sexual activity: Not on file   Other Topics Concern    Not on file   Social History Narrative    Not on file     Social Determinants of Health     Financial Resource Strain: Low Risk     Difficulty of Paying Living Expenses: Not hard at all   Food Insecurity: No Food Insecurity    Worried About [address] in the Last Year: Never true    [address] [employer] in the Last Year: Never true   Transportation Needs: No Transportation Needs    Lack of Transportation (Medical): No    Lack of Transportation (Non-Medical): No   Physical Activity: Insufficiently Active    Days of Exercise per Week: 4 days    Minutes of Exercise per Session: 30 min   Stress: No Stress Concern Present    Feeling of Stress : Not at all   Social Connections: Socially Integrated    Frequency of Communication with Friends and Family:  Three times a week    Frequency of Social Gatherings with Friends and Family: More than three times a week    Attends Religion Services: More than 4 times per year    Active Member of [employer] or Organizations: Yes    Attends Club or Organization Meetings: More than 4 times per year    Marital Status:    Intimate Partner Violence: Not At Risk    Fear of Current or Ex-Partner: No Emotionally Abused: No    Physically Abused: No    Sexually Abused: No   Housing Stability: Not on file     ROS: Non-contributory    Physical Exam:  Vitals:    03/08/23 1103   BP: (!) 160/91   Pulse: 76   Resp: 18   Temp: 97.6 °F (36.4 °C)   SpO2: 97%       Chest: Breath sounds were clear and equal with no rales, wheezes, or rhonchi. Respiratory effort was normal with no retractions or use of accessory muscles. Cardiovascular: Heart sounds were normal with a regular rate and rhythm. There were no murmurs, gallops or rubs. Abdomen: Bowel sounds were normal.  The abdomen was soft and non distended. There was no tenderness, guarding, rebound, or rigidity. There were no masses, hepatosplenomegaly, or hernias. Assessment    Mr. Mariely Nash is a 72year old man with a PMH of hypertension, hyperlipidemia, acid reflux who presents for colon cancer screening. His last EGD and colonoscopy was in 06/2012 with findings of  hyperplastic polyps and recommendations for follow-up in 10 years. ASA 2, Mallampati score 2. Plan    1. Colon cancer screening  - COLONOSCOPY (Screening); Future  - EGD; Future  - polyethylene glycol-electrolytes (NULYTELY) 420 g solution; Take 4,000 mLs by mouth once for 1 dose  Dispense: 4000 mL; Refill: 0     2. Gastroesophageal reflux disease without esophagitis  - COLONOSCOPY (Screening); Future  - EGD; Future  - polyethylene glycol-electrolytes (NULYTELY) 420 g solution; Take 4,000 mLs by mouth once for 1 dose  Dispense: 4000 mL; Refill: 0     3. Additional recommendations based on findings. VERIFICATION OF CONSENT    The patient was counseled regarding the procedure, its indications, risks, potential complications and alternatives. Any questions were answered.  Consent was obtained    Electronically signed by Jitendra Medina MD on 3/8/2023 at 11:37 AM

## 2023-03-08 NOTE — ANESTHESIA POSTPROCEDURE EVALUATION
Department of Anesthesiology  Postprocedure Note    Patient: Landy Shukla  MRN: [id number]  YOB: 1950  Date of evaluation: 3/8/2023      Procedure Summary     Date: 03/08/23 Room / Location: [address] [place] [address]    Anesthesia Start: 1245 Anesthesia Stop: 1338    Procedures:       COLONOSCOPY POLYPECTOMY REMOVAL HOT SNARE / COLD BIOPSIES      EGD BIOPSY Diagnosis:       Screening for colon cancer      Gastroesophageal reflux disease with esophagitis, unspecified whether hemorrhage      (SCREENING,  GERD)    Surgeons: Maple Kanner, MD Responsible Provider: NEHEMIAS Gayle CRNA    Anesthesia Type: general, TIVA ASA Status: 3          Anesthesia Type: No value filed. Kai Phase I: Kai Score: 10    Kai Phase II: Kai Score: 10      Anesthesia Post Evaluation    Patient location during evaluation: PACU  Patient participation: complete - patient participated  Level of consciousness: awake  Airway patency: patent  Nausea & Vomiting: no vomiting and no nausea  Complications: no  Cardiovascular status: blood pressure returned to baseline and hemodynamically stable  Respiratory status: acceptable, spontaneous ventilation and room air  Hydration status: stable  Comments: Patient experienced a several beat run of complete heart block during colonoscopy procedure. This resolved spontaneously before medical intervention. Patient remained hemodynamically stable throughout the rest of the procedure. Patient is being admitted for 24 hour observation.

## 2023-03-08 NOTE — CONSULTS
I, Dayton Car am scribing for and in the presence of Evelina Nelson PA-C. Patient: Jamar Sherman  : 1950  Date of Admission: 3/8/2023  Primary Care Physician: Nyasia Richey  Today's Date: 3/8/2023    REASON FOR CONSULTATION: No chief complaint on file. HPI: Mr. Anna Simms is a 72 y.o. male who was admitted to the hospital with complete heart block not caught on strip. Mr. Anna Simms has a known history of hypertension which at times has been difficult to control. He has never had a heart history or seen a cardiologist.  No family history. Mr. Anna Simms was admitted today after getting EGD he had complete heart block seen on telemetry for 6 seconds. He has slight lightheadedness the last couple of days when getting out of bed. He thought it was vertigo. Denies any falls or near falls. He is eating and drinking fine. He does have occasional constipation. Denies ever having any heart problems. He has been told in the past he had irregular heart beat. He quit smoking about 20 years ago. He smoked about 10 years total, did not smoke a pack daily. Mr. Anna Simms also denied any current or recent chest pain, abdominal pain, bleeding problems, problems with his medications or any other concerns at this time. Past Medical History:   Diagnosis Date    Cancer ([facility] [address]     skin    GERD (gastroesophageal reflux disease)     Hearing loss     High cholesterol     Hypertension     Stroke ([address]     Unspecified cerebral artery occlusion with cerebral infarction        CURRENT ALLERGIES: Pcn [penicillins] REVIEW OF SYSTEMS: 14 systems were reviewed. Pertinent positives and negatives as above, all else negative.      Past Surgical History:   Procedure Laterality Date    COLONOSCOPY  2012    ESOPHAGOGASTRODUODENOSCOPY  2012    PROSTATE SURGERY      SKIN CANCER DESTRUCTION  2008    SKIN CANCER EXCISION  2008    Social History:  Social History     Tobacco Use    Smoking status: Former     Packs/day: 1.00     Years: 15.00     Pack years: 15.00     Types: Cigarettes     Start date: 8/20/2003    Smokeless tobacco: Never   Vaping Use    Vaping Use: Never used   Substance Use Topics    Alcohol use: Yes     Comment: glass of wine on ocass    Drug use: Never        CURRENT MEDICATIONS:  Outpatient Medications Marked as Taking for the 3/8/23 encounter [facility] Encounter)   Medication Sig Dispense Refill    omeprazole (PRILOSEC) 20 MG delayed release capsule Take 1 capsule by mouth every morning (before breakfast) 30 capsule 2    Multiple Vitamins-Minerals (THERAPEUTIC MULTIVITAMIN-MINERALS) tablet Take 1 tablet by mouth daily         lactated ringers IV soln infusion, Continuous         FAMILY HISTORY: family history includes Alzheimer's Disease in his maternal grandmother and mother; Cancer in his maternal grandfather; Delight Pod in his mother; Heart Disease in his brother and father. PHYSICAL EXAM:   BP (!) 161/92   Pulse 66   Temp 97.2 °F (36.2 °C) (Temporal)   Resp (!) 72   Ht 5' 6\" (1.676 m)   Wt 207 lb (93.9 kg)   SpO2 96%   BMI 33.41 kg/m²  Body mass index is 33.41 kg/m². Constitutional: He is oriented to person, place, and time. He appears well-developed and well-nourished. In no acute distress. HEENT: Normocephalic and atraumatic. No JVD present. Carotid bruit is not present. No mass and no thyromegaly present. No lymphadenopathy present. Cardiovascular: Normal rate, regular rhythm, normal heart sounds. Exam reveals no gallop and no friction rubs. No heart murmur heard. Pulmonary/Chest: Effort normal and breath sounds normal. No respiratory distress. He has no wheezes, rhonchi or rales. Abdominal: Soft, non-tender. Bowel sounds and aorta are normal. He exhibits no organomegaly, mass or bruit. Extremities: Trace lower extremity edema. No cyanosis and no clubbing. Pulses are 2+ radial and carotid pulses. 2+ dorsalis pedis and posterior tibial pulses bilaterally.   Neurological: He is alert and oriented to person, place, and time. No evidence of gross cranial nerve deficit. Coordination appeared normal.   Skin: Skin is warm and dry. There is no rash or diaphoresis. Psychiatric: He has a normal mood and affect. His speech is normal and behavior is normal.      MOST RECENT LABS ON RECORD:   Lab Results   Component Value Date    WBC 6.8 05/04/2022    HGB 15.3 05/04/2022    HCT 44.4 05/04/2022     05/04/2022    CHOL 163 05/04/2022    TRIG 231 (H) 05/04/2022    HDL 36 (L) 05/04/2022    LDLCHOLESTEROL 97 01/16/2018    ALT 43 (H) 10/28/2022    AST 32 10/28/2022     10/28/2022    K 4.2 10/28/2022     10/28/2022    CREATININE 0.86 10/28/2022    BUN 11 10/28/2022    CO2 27 10/28/2022    PSA 0.483 09/15/2018    LABA1C 6.0 05/04/2022        ASSESSMENT:  Patient Active Problem List    Diagnosis Date Noted    Polyp of colon 03/08/2023    Hiatal hernia 03/08/2023    Luan lesion, acute 03/08/2023    Gastritis and duodenitis 03/08/2023    Acute erosion of duodenum 03/08/2023    Heart block 03/08/2023    Chronic interstitial cystitis 04/22/2022    Enlarged prostate 04/22/2022    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 Essential Hypertension: Controlled  Beta Blocker: Not indicated at this time. ACE Inibitor/ARB: Continue losartan (Cozaar) 100 mg daily. Diuretics: Not indicated at this time. Calcium Channel Blocker: Not indicated at this time.    Additional Testing List: I ordered a echocardiogram to better assess for the etiology of this problem and to help guide future management and I ordered a treadmill stress test WITHOUT imaging to try and rule out this possibility. Hyperlipidemia: Mixed  Cholesterol Reduction Therapy: Continue Atorvastatin (Lipitor) 20 mg daily. Once again, thank you for allowing me to participate in this patients care. Please do not hesitate to contact me could I be of further assistance. I discussed patient's symptoms and treatment plan with Dr Newton Ogden, he was in agreement with the plan. Sincerely,  Wendy Mcdaniels PA-C  [facility] Cardiology Specialist    [address] [place] [address]  Phone: [phone], Fax: [phone]     I believe that the risk of significant morbidity and mortality related to the patient's current medical conditions are: intermediate-high. The documentation recorded by the scribe, accurately and completely reflects the services I personally performed and the decisions made by me.  Wendy Mcdaniels PA-C March 8, 2023

## 2023-03-08 NOTE — DISCHARGE INSTRUCTIONS
SAME DAY SURGERY DISCHARGE INSTRUCTIONS    1. Do not drive or operate hazardous machinery for 24 hours. 2.  Do not make important personal or business decisions for 24 hours. 3.  Do not drink alcoholic beverages for 24 hours. 4.  Do not smoke tobacco products for 24 hours. 5.  Eat light foods (Jell-O, soups, etc....) and drink plenty of fluids (water, Sprite, etc...) up to 8 glasses per day, as you can tolerate. 6.  Limit your activities for 24 hours. Do not engage in heavy work until your surgeon gives you permission. 7.  Call your surgeon for any questions regarding your surgery. 8.  Patient should not be left alone for 12-24 hours following surgical procedure. COLONOSCOPY DISCHARGE INSTRUCTIONS:    It's normal to have a feeling of fullness or mild cramping in your abdomen afterwards due to air that is put into your bowel during the procedure. Mild activities such as walking will help you pass the air. You may resume your regular diet. ENDOSCOPY DISCHARGE INSTRUCTIONS:    You may have a mild sore throat; this should get better over the next day or two. Sipping warm liquids, a salt-water gargle or throat lozenges may be used. You may have some belching or a feeling of fullness in your abdomen. This is from air that was put into your stomach during the procedure. This should pass in a few hours. May resume your regular diet. CALL THE DOCTOR IF YOU HAVE:    Chest pain or trouble breathing. A hoarse voice or trouble swallowing    Bleeding, vomiting or spitting up of blood that is more than a few streaks or red or black stools    A fever above 101F or if you have chills    Pain that is worse or different than any pain you had before the procedure    Nausea or vomiting that lasts for more than 2 hours. If symptoms are to severe call 911 or go to the nearest Emergency Room.

## 2023-03-08 NOTE — OP NOTE
[facility]    COLONOSCOPY    PROCEDURE DATE: 03/08/23    REFERRING PHYSICIAN: No ref. provider found     PRIMARY CARE PROVIDER: NEHEMIAS Castrejon - CNP    ATTENDING PHYSICIAN: Napoleon Price MD     HISTORY: Mr. Ulisses Gilbert is a 72 y.o. male who presents to the  endoscopy unit for colonoscopy. The patient's clinical history is remarkable for hypertension. He is currently medically stable and appropriate for the planned procedure. PREOPERATIVE DIAGNOSIS: screening for colon cancer. PROCEDURES:   Transanal Colonoscopy with polypectomy (snare cautery). POSTPROCEDURE DIAGNOSIS:  Colon polyps      MEDICATIONS: MAC per anesthesia     EBL 2 ml      INSTRUMENT: Olympus CF-H190 AL Pediatric flexible Colonoscope. PREPARATION: The nature and character of the procedure as well as risks, benefits, and alternatives were discussed with the patient and informed consent was obtained. Complications were said to include, but were not limited to: medication allergy, medication reaction, cardiovascular and respiratory problems, bleeding, perforation, infection, and/or missed diagnosis. Following arrival in the endoscopy room, the patient was placed in the left lateral decubitus position and final time-out accomplished in the presence of the nursing staff. Baseline vital signs were obtained and reviewed, and IV sedation was subsequently initiated. FINDINGS: Rectal examination demonstrated no significant visible external abnormality and digital palpation was unremarkable. Following adequate conscious sedation the colonoscope was introduced and advanced under direct visualization to the cecum, which was identified by the ileocecal valve and appendiceal orifice. The bowel preparation was felt to be fair - fecaliths in sigmoid colon. Cecal intubation time was 5 minutes. Once maximally inserted, the endoscope was withdrawn and the mucosa was carefully inspected.  The mucosal exam revealed moderate sigmoid diverticulosis. Colon polyps were removed:    Transverse colon: 7mm removed with a hot snare. Recto sigmoid: 6 mm removed with a cold biopsy forceps  Rectum: 7mm, 7mm, 7mm remove with a hot snare. 5mm, 6mm, 4mm remove with cold biopsy forceps. Retroflexion was performed in the rectum and no internal hemorrhoids were noted. Withdrawal time was 21 minutes. IMPRESSION:   Colon polyps  Diverticulosis     RECOMMENDATIONS:   1) Follow up with referring provider, as previously scheduled. 2) Repeat Colonoscopy based on pathology - no later than 3 years       Electronically signed by Gutierrez Del Valle MD on 3/8/2023 at 1:43 PM       The patient was counseled at length about the risks of uriah Covid-19 during their perioperative period and any recovery window from their procedure. The patient was made aware that uriah Covid-19  may worsen their prognosis for recovering from their procedure  and lend to a higher morbidity and/or mortality risk. All material risks, benefits, and reasonable alternatives including postponing the procedure were discussed. The patient DOES wish to proceed with the procedure at this time.

## 2023-03-08 NOTE — PROGRESS NOTES

## 2023-03-08 NOTE — OP NOTE
[facility]    EGD    PROCEDURE DATE: 03/08/23    REFERRING PHYSICIAN: No ref. provider found     PRIMARY CARE PROVIDER: Jeffrey Lopez APRN - CNP    ATTENDING PHYSICIAN: Tamera Houston MD     HISTORY: Mr. Doug Whipple is a 72 y.o. male who presents to the  Endoscopy unit for upper endoscopy. The patient's clinical history is remarkable for hypertension. He is currently medically stable and appropriate for the planned procedure. PREOPERATIVE DIAGNOSIS: .     PROCEDURES:   1) Transoral Upper Endoscop. POSTOPERATIVE DIAGNOSIS:   Hiatal hernia  Luan lesions  Duodentiis and gastritis  Duodenal erosions     MEDICATIONS: MAC per anesthesia     EBL: 2 ml    INSTRUMENT: Olympus GIF-H190 flexible Gastroscope. PREPARATION: The nature and character of the procedure as well as risks, benefits, and alternatives were discussed with the patient and informed consent was obtained. Complications were said to include, but were not limited to: medication allergy, medication reaction, cardiovascular and respiratory problems, bleeding, perforation, infection, and/or missed diagnosis. Following arrival in the endoscopy room, the patient was placed in the left lateral decubitus position and final time-out accomplished in the presence of the nursing staff. Baseline vital signs were obtained and reviewed, and IV sedation was subsequently initiated. FINDINGS:   Esophagus: The esophagus was inspected to the Z-line. The endoscopic exam showed a regular  appearing GEJ at 36cm from the incisor with no ulcers, lesions or Schatzki's ring. Stomach: The stomach was inspected in both forward and retroflex fashion and was appropriately distensible. The cardia, fundus, incisura, antrum and pylorus were identified via direct visualization. The endoscopic exam showed a hiatal hernia from 36cm to 44cm with linear ulcerations lining the sliding mucosa of the hiatal hernia. No ulcers on incisura on retroflex.  Gagan Bahena ulcers noted on gastric antrum - erythema noted. .    Duodenum: The proximal small bowel was inspected through the bulb, sweep, and second portion of the duodenum. The endoscopic exam showed scattered erosions and erythema in the bulb, Normal appearing sweep to 3rd portion. Biopsies of the gastric antrum, duodenal bulb, obtained with cold biopsy forceps. IMPRESSION:  Hiatal hernia  Luan lesions  Duodentiis and gastritis  Duodenal erosions       RECOMMENDATIONS:   1) F/U in clinic. Will need to be referred to see a surgeon for hiatal hernia repair   2) Avoid NSAIDs. These include medications like ibuprofen, Aleve, Motrin, Naprosyn, aspirin 325 mg. Alternative is acetaminophen no more than 2400 mg daily. 3) Recommend omeprazole 20 mg daily for 30 days ordered    Electronically signed by Choco Merchant MD on 3/8/2023 at 1:48 PM    The patient was counseled at length about the risks of uriah Covid-19 during their perioperative period and any recovery window from their procedure. The patient was made aware that uriah Covid-19  may worsen their prognosis for recovering from their procedure  and lend to a higher morbidity and/or mortality risk. All material risks, benefits, and reasonable alternatives including postponing the procedure were discussed. The patient DOES wish to proceed with the procedure at this time.

## 2023-03-09 ENCOUNTER — APPOINTMENT (OUTPATIENT)
Dept: NON INVASIVE DIAGNOSTICS | Age: 73
End: 2023-03-09
Attending: INTERNAL MEDICINE
Payer: MEDICARE

## 2023-03-09 VITALS
BODY MASS INDEX: 33.19 KG/M2 | HEIGHT: 66 IN | TEMPERATURE: 97.7 F | SYSTOLIC BLOOD PRESSURE: 151 MMHG | OXYGEN SATURATION: 95 % | DIASTOLIC BLOOD PRESSURE: 92 MMHG | HEART RATE: 74 BPM | WEIGHT: 206.5 LBS | RESPIRATION RATE: 16 BRPM

## 2023-03-09 DIAGNOSIS — G47.33 OSA (OBSTRUCTIVE SLEEP APNEA): Primary | ICD-10-CM

## 2023-03-09 LAB
ABSOLUTE EOS #: 0.16 K/UL (ref 0–0.44)
ABSOLUTE IMMATURE GRANULOCYTE: <0.03 K/UL (ref 0–0.3)
ABSOLUTE LYMPH #: 1.18 K/UL (ref 1.1–3.7)
ABSOLUTE MONO #: 0.83 K/UL (ref 0.1–1.2)
ALBUMIN SERPL-MCNC: 4.2 G/DL (ref 3.5–5.2)
ALBUMIN/GLOBULIN RATIO: 1.5 (ref 1–2.5)
ALP SERPL-CCNC: 47 U/L (ref 40–129)
ALT SERPL-CCNC: 44 U/L (ref 5–41)
ANION GAP SERPL CALCULATED.3IONS-SCNC: 10 MMOL/L (ref 9–17)
AST SERPL-CCNC: 28 U/L
BASOPHILS # BLD: 1 % (ref 0–2)
BASOPHILS ABSOLUTE: 0.04 K/UL (ref 0–0.2)
BILIRUB SERPL-MCNC: 0.5 MG/DL (ref 0.3–1.2)
BUN SERPL-MCNC: 13 MG/DL (ref 8–23)
BUN/CREAT BLD: 16 (ref 9–20)
CALCIUM SERPL-MCNC: 9.2 MG/DL (ref 8.6–10.4)
CHLORIDE SERPL-SCNC: 105 MMOL/L (ref 98–107)
CO2 SERPL-SCNC: 25 MMOL/L (ref 20–31)
CREAT SERPL-MCNC: 0.8 MG/DL (ref 0.7–1.2)
EKG ATRIAL RATE: 70 BPM
EKG P AXIS: 65 DEGREES
EKG P-R INTERVAL: 206 MS
EKG Q-T INTERVAL: 406 MS
EKG QRS DURATION: 84 MS
EKG QTC CALCULATION (BAZETT): 438 MS
EKG R AXIS: -3 DEGREES
EKG T AXIS: 47 DEGREES
EKG VENTRICULAR RATE: 70 BPM
EOSINOPHILS RELATIVE PERCENT: 3 % (ref 1–4)
GFR SERPL CREATININE-BSD FRML MDRD: >60 ML/MIN/1.73M2
GLUCOSE SERPL-MCNC: 101 MG/DL (ref 70–99)
HCT VFR BLD AUTO: 42.9 % (ref 40.7–50.3)
HGB BLD-MCNC: 14.9 G/DL (ref 13–17)
IMMATURE GRANULOCYTES: 0 %
LV EF: 60 %
LVEF MODALITY: NORMAL
LYMPHOCYTES # BLD: 19 % (ref 24–43)
MCH RBC QN AUTO: 31.5 PG (ref 25.2–33.5)
MCHC RBC AUTO-ENTMCNC: 34.7 G/DL (ref 28.4–34.8)
MCV RBC AUTO: 90.7 FL (ref 82.6–102.9)
MONOCYTES # BLD: 13 % (ref 3–12)
NRBC AUTOMATED: 0 PER 100 WBC
PDW BLD-RTO: 12 % (ref 11.8–14.4)
PLATELET # BLD AUTO: 206 K/UL (ref 138–453)
PMV BLD AUTO: 8.5 FL (ref 8.1–13.5)
POTASSIUM SERPL-SCNC: 3.7 MMOL/L (ref 3.7–5.3)
PROT SERPL-MCNC: 7 G/DL (ref 6.4–8.3)
RBC # BLD: 4.73 M/UL (ref 4.21–5.77)
SEG NEUTROPHILS: 64 % (ref 36–65)
SEGMENTED NEUTROPHILS ABSOLUTE COUNT: 4.02 K/UL (ref 1.5–8.1)
SODIUM SERPL-SCNC: 140 MMOL/L (ref 135–144)
WBC # BLD AUTO: 6.3 K/UL (ref 3.5–11.3)

## 2023-03-09 PROCEDURE — 93242 EXT ECG>48HR<7D RECORDING: CPT

## 2023-03-09 PROCEDURE — 85025 COMPLETE CBC W/AUTO DIFF WBC: CPT

## 2023-03-09 PROCEDURE — 94761 N-INVAS EAR/PLS OXIMETRY MLT: CPT

## 2023-03-09 PROCEDURE — 93005 ELECTROCARDIOGRAM TRACING: CPT | Performed by: INTERNAL MEDICINE

## 2023-03-09 PROCEDURE — 6370000000 HC RX 637 (ALT 250 FOR IP): Performed by: INTERNAL MEDICINE

## 2023-03-09 PROCEDURE — 93017 CV STRESS TEST TRACING ONLY: CPT | Performed by: INTERNAL MEDICINE

## 2023-03-09 PROCEDURE — 93243 EXT ECG>48HR<7D SCAN A/R: CPT

## 2023-03-09 PROCEDURE — 93017 CV STRESS TEST TRACING ONLY: CPT

## 2023-03-09 PROCEDURE — G0378 HOSPITAL OBSERVATION PER HR: HCPCS

## 2023-03-09 PROCEDURE — 93306 TTE W/DOPPLER COMPLETE: CPT

## 2023-03-09 PROCEDURE — 80053 COMPREHEN METABOLIC PANEL: CPT

## 2023-03-09 PROCEDURE — 99232 SBSQ HOSP IP/OBS MODERATE 35: CPT | Performed by: INTERNAL MEDICINE

## 2023-03-09 PROCEDURE — 93246 EXT ECG>7D<15D RECORDING: CPT

## 2023-03-09 PROCEDURE — 36415 COLL VENOUS BLD VENIPUNCTURE: CPT

## 2023-03-09 PROCEDURE — 2580000003 HC RX 258: Performed by: INTERNAL MEDICINE

## 2023-03-09 PROCEDURE — 93247 EXT ECG>7D<15D SCAN A/R: CPT

## 2023-03-09 PROCEDURE — 93010 ELECTROCARDIOGRAM REPORT: CPT | Performed by: INTERNAL MEDICINE

## 2023-03-09 RX ADMIN — AMLODIPINE BESYLATE 10 MG: 10 TABLET ORAL at 08:58

## 2023-03-09 RX ADMIN — ATORVASTATIN CALCIUM 20 MG: 20 TABLET, FILM COATED ORAL at 08:58

## 2023-03-09 RX ADMIN — LOSARTAN POTASSIUM 100 MG: 50 TABLET, FILM COATED ORAL at 08:58

## 2023-03-09 RX ADMIN — SODIUM CHLORIDE, PRESERVATIVE FREE 10 ML: 5 INJECTION INTRAVENOUS at 08:58

## 2023-03-09 NOTE — PROGRESS NOTES
Ever Richard am scribing for and in the presence of Debby Rodriguez MD, F.A.C.C..    Patient: Doug Whipple  : 1950  Date of Admission: 3/8/2023  Primary Care Physician: Jeffrey Lopez  Today's Date: 3/9/2023    REASON FOR CONSULTATION: Reported complete heart block during colonoscopy. No documentation whether is not strip or ECG. HPI: Mr. Justin Cooper is a 72 y.o. male who was admitted to the hospital with complete heart block not caught on strip. Mr. Justin Cooper has a known history of hypertension which at times has been difficult to control. He has never had a heart history or seen a cardiologist.   His father has family history of heart disease in his 66's. He is former smoker in his 29's    Echo done on 3/8/2023- Global left ventricular systolic function appears preserved with an estimated ejection fraction of 60%. The left ventricular cavity size is within normal limits and the left ventricular wall thickness is mildly increased. No definite specific wall motion abnormalities were identified. No significant valvular abnormalities. Evidence of mild diastolic dysfunction is seen. Stress test done on 3/9/2023: Exercise duration was 6 minutes and 24 seconds. Normal heart rate and blood pressure response to exercise. No chest pain at peak exercise or during recovery. No definite ischemic ECG changes noted. Low risk stress test.    Mr. Justin Cooper is doing ok today. He had his echo and stress test done as before. He is currently asymptomatic. He denied any dizziness, lightheadedness or syncopal episodes prior to his colonoscopy. He is taking his blood pressure medicines for many years. Telemetry reviewed, no significant bradycardia or pauses. I discussed the result of the echo, stress test, telemetry reports with him in great details.   I did explain to him that the reported complete heart block on telemetry was not documented or printed but with her with seriously by admitting him for observation getting echo and stress test as above. He will be discharged with a vital connect monitor and I will reevaluate him in the office in 3 weeks. The advantage of having him on a vital connect if that if he has seizures heart rate or rhythm abnormalities they will call us immediately and we will get him to be seen sooner. Past Medical History:   Diagnosis Date    Cancer ([facility]     skin    GERD (gastroesophageal reflux disease)     Hearing loss     High cholesterol     Hypertension     Stroke ([facility]     Unspecified cerebral artery occlusion with cerebral infarction        CURRENT ALLERGIES: Pcn [penicillins] REVIEW OF SYSTEMS: 14 systems were reviewed. Pertinent positives and negatives as above, all else negative. Past Surgical History:   Procedure Laterality Date    COLONOSCOPY  2012    COLONOSCOPY N/A 3/8/2023    COLONOSCOPY POLYPECTOMY REMOVAL HOT SNARE / COLD BIOPSIES performed by Asia Mcconnell MD at [address] [facility]  2012    PROSTATE SURGERY      SKIN CANCER DESTRUCTION  01/01/2008    SKIN CANCER EXCISION  01/01/2008    UPPER GASTROINTESTINAL ENDOSCOPY N/A 3/8/2023    EGD BIOPSY performed by Asia Mcconnell MD at [address] History:  Social History     Tobacco Use    Smoking status: Former     Packs/day: 1.00     Years: 15.00     Pack years: 15.00     Types: Cigarettes     Start date: 8/20/2003    Smokeless tobacco: Never   Vaping Use    Vaping Use: Never used   Substance Use Topics    Alcohol use:  Yes     Alcohol/week: 1.0 standard drink     Types: 1 Glasses of wine per week     Comment: glass of wine on ocass    Drug use: Never        CURRENT MEDICATIONS:  Outpatient Medications Marked as Taking for the 3/8/23 encounter [facility] Encounter)   Medication Sig Dispense Refill    omeprazole (PRILOSEC) 20 MG delayed release capsule Take 1 capsule by mouth every morning (before breakfast) 30 capsule 2    Zolpidem Tartrate (AMBIEN PO) Take 10 mg by mouth nightly Multiple Vitamins-Minerals (THERAPEUTIC MULTIVITAMIN-MINERALS) tablet Take 1 tablet by mouth daily         sodium chloride flush 0.9 % injection 5-40 mL, 2 times per day  sodium chloride flush 0.9 % injection 10 mL, PRN  0.9 % sodium chloride infusion, PRN  enoxaparin (LOVENOX) injection 40 mg, Nightly  ondansetron (ZOFRAN-ODT) disintegrating tablet 4 mg, Q8H PRN   Or  ondansetron (ZOFRAN) injection 4 mg, Q6H PRN  polyethylene glycol (GLYCOLAX) packet 17 g, Daily PRN  acetaminophen (TYLENOL) tablet 650 mg, Q6H PRN   Or  acetaminophen (TYLENOL) suppository 650 mg, Q6H PRN  zolpidem (AMBIEN) tablet 10 mg, Nightly PRN  atorvastatin (LIPITOR) tablet 20 mg, Daily  losartan (COZAAR) tablet 100 mg, Daily  amLODIPine (NORVASC) tablet 10 mg, Daily  finasteride (PROSCAR) tablet 5 mg, Once per day on Mon Wed Fri       FAMILY HISTORY: family history includes Alzheimer's Disease in his maternal grandmother and mother; Cancer in his maternal grandfather; Colon Cancer in his mother; Heart Disease in his brother and father. PHYSICAL EXAM:   BP (!) 151/92   Pulse 74   Temp 97.7 °F (36.5 °C) (Temporal)   Resp 16   Ht 5' 6\" (1.676 m)   Wt 206 lb 8 oz (93.7 kg)   SpO2 95%   BMI 33.33 kg/m²  Body mass index is 33.33 kg/m². Constitutional: He is oriented to person, place, and time. He appears well-developed and well-nourished. In no acute distress. HEENT: Normocephalic and atraumatic. No JVD present. Carotid bruit is not present. No mass and no thyromegaly present. No lymphadenopathy present. Cardiovascular: Normal rate, regular rhythm, normal heart sounds. Exam reveals no gallop and no friction rubs. No heart murmur heard. Pulmonary/Chest: Effort normal and breath sounds normal. No respiratory distress. He has no wheezes, rhonchi or rales. Abdominal: Soft, non-tender. Bowel sounds and aorta are normal. He exhibits no organomegaly, mass or bruit. Extremities: No edema. No cyanosis and no clubbing.  Pulses are 2+ radial and carotid pulses. 2+ dorsalis pedis and posterior tibial pulses bilaterally. Neurological: He is alert and oriented to person, place, and time. No evidence of gross cranial nerve deficit. Coordination appeared normal.   Skin: Skin is warm and dry. There is no rash or diaphoresis. Psychiatric: He has a normal mood and affect. His speech is normal and behavior is normal.      MOST RECENT LABS ON RECORD:   Lab Results   Component Value Date    WBC 6.3 03/09/2023    HGB 14.9 03/09/2023    HCT 42.9 03/09/2023     03/09/2023    CHOL 163 05/04/2022    TRIG 231 (H) 05/04/2022    HDL 36 (L) 05/04/2022    LDLCHOLESTEROL 97 01/16/2018    ALT 44 (H) 03/09/2023    AST 28 03/09/2023     03/09/2023    K 3.7 03/09/2023     03/09/2023    CREATININE 0.80 03/09/2023    BUN 13 03/09/2023    CO2 25 03/09/2023    PSA 0.483 09/15/2018    LABA1C 6.0 05/04/2022        ASSESSMENT:  Patient Active Problem List    Diagnosis Date Noted    Polyp of colon 03/08/2023    Hiatal hernia 03/08/2023    Luan lesion, acute 03/08/2023    Gastritis and duodenitis 03/08/2023    Acute erosion of duodenum 03/08/2023    Heart block 03/08/2023    Chronic interstitial cystitis 04/22/2022    Enlarged prostate 04/22/2022    Impotence of organic origin 04/22/2022    Morbidly obese ([facility] 05/05/2020    Unspecified hyperplasia of prostate without urinary obstruction and other lower urinary tract symptoms (LUTS) 02/10/2015    Insomnia 02/10/2015    Vertigo, intermittent 08/20/2013    Meniere syndrome 08/20/2013    Hypertension 08/20/2013    Hyperlipidemia 08/20/2013    Encounter for counseling 08/20/2013       PLAN:  Possible complete heart block not caught on strip   I had a very long discussion with the patient regarding this questionable heart block. I did explain to him that sometimes bradycardia occurs during sedation because of medication side effects and increased vagal stimulation.   Also it can happen secondary to obstructive sleep apnea syndrome and patient did report back he snores loudly and his wife told him he stops breathing at night. He was never tested for obstructive sleep apnea syndrome. We have no documentation of his questionable heart block but as per anesthesia team it lasted for about 6 seconds. Patient admitted for 24 hours, telemetry did not reveal any significant bradycardia. Echo and stress test are unremarkable as above. I ordered Vital connect monitor to be placed before discharge. Patient reported no dizziness, lightheadedness, chronic fatigue or passing out episodes prior to his current hospital admission. Essential Hypertension: Uncontrolled  Beta Blocker: Avoid beta-blocker therapy. ACE Inibitor/ARB: Continue losartan (Cozaar) 100 mg daily. Diuretics: Not indicated at this time. Calcium Channel Blocker: Continue amlodipine (Norvasc) 10 mg once daily. Additional Testing List: None     Hyperlipidemia: Mixed  Cholesterol Reduction Therapy: Continue Atorvastatin (Lipitor) 20 mg daily. Suspected Obstructive Sleep Apnea: Given Mr. Dunn's symptoms of daytime tiredness and not waking up rested in the morning, I advised him to consider undergoing a sleep apnea screening which he agreed to do. Therefore I ordered a Apnea link home screening monitor for him. I will see him in office in a few weeks for follow up. Once again, thank you for allowing me to participate in this patients care. Please do not hesitate to contact me could I be of further assistance. Sincerely,  Isabella Evans MD, F.A.C.C. [facility] Cardiology Specialist    [address]  Phone: [phone], Fax: [phone]     I believe that the risk of significant morbidity and mortality related to the patient's current medical conditions are: intermediate-high.       The documentation recorded by the scribe, accurately and completely reflects the services I personally performed and the decisions made by me. Anh Tesfaye MD, F.A.C.C.  March 9, 2023

## 2023-03-09 NOTE — PROGRESS NOTES
Nutrition Assessment     Type and Reason for Visit: Initial    Nutrition Recommendations/Plan:   Continue NPO diet. Progress to GERD/PUD diet (hiatal hernia, Duodenitis, gastritis, duodenal erosions, [place] lesions). Malnutrition Assessment:  Malnutrition Status: Insufficient data    Nutrition Assessment:  Inadequate oral intakes r/t altered GI aeb NPO. Pt out of room for testing. Nutrition Related Findings:   unable to assess Wound Type: None    Current Nutrition Therapies:    Diet NPO    Anthropometric Measures:  Height: 5' 6\" (167.6 cm)  Current Body Wt: 206 lb 8 oz (93.7 kg)   BMI: 33.3  Lab Results   Component Value Date/Time    LABA1C 6.0 05/04/2022 08:45 AM      Recent Labs     03/09/23  0610      K 3.7      CO2 25   BUN 13   CREATININE 0.80   GLUCOSE 101*   ALT 44*   ALKPHOS 47      Lab Results   Component Value Date/Time    LABALBU 4.2 03/09/2023 06:10 AM       Lab Results   Component Value Date/Time    TRIG 231 05/04/2022 08:45 AM    HDL 36 05/04/2022 08:45 AM    LDLCALC 81 05/04/2022 08:45 AM    LABVLDL 46 05/04/2022 08:45 AM      Nutrition Diagnosis:   Inadequate oral intake related to altered GI function as evidenced by NPO or clear liquid status due to medical condition    Nutrition Interventions:   Food and/or Nutrient Delivery: Continue NPO  Nutrition Education/Counseling: No recommendation at this time  Coordination of Nutrition Care: Continue to monitor while inpatient  Plan of Care discussed with: no one    Goals:     Goals: Initiate PO diet       Nutrition Monitoring and Evaluation:   Behavioral-Environmental Outcomes: None Identified  Food/Nutrient Intake Outcomes: Diet Advancement/Tolerance  Physical Signs/Symptoms Outcomes: Biochemical Data, Weight    Discharge Planning:     Too soon to determine     Jorge Gaffney, [address], [place]  Contact: [phone]

## 2023-03-09 NOTE — PROGRESS NOTES
Mendez Bone CNP, informed pt returned from Stress test and requesting diet. N.O. Cardiac Adult diet. Pt aware and ordering food. Will continue to monitor.

## 2023-03-09 NOTE — PROCEDURES
[facility], [address] [facility]                              CARDIAC STRESS TEST    PATIENT NAME: Fortino Ordoñez                     :        1950  MED REC NO:   [id number]                              ROOM:       0742  ACCOUNT NO:   [de-identified]                           ADMIT DATE: 2023  PROVIDER:     Mariajose Robertson MD    CARDIOVASCULAR DIAGNOSTIC DEPARTMENT    DATE OF STUDY:  2023    ORDERING PROVIDER:  Foreign Cabrera. Selena Sanz MD    PRIMARY CARE PROVIDER:  Jess Alamo CNP    INTERPRETING PHYSICIAN:  Foreign Cabrera. Selena Sanz MD    EXERCISE STRESS TEST REPORT    Stress, exercise stress. INDICATIONS:  Assessment of a cardiac cause:  Abnormal ECG. CLINICAL HISTORY:  The patient is a 72-year-old man with no known  coronary artery disease. Previous cardiac history includes:  None. Other previous history includes:  Cerebrovascular accident, caffeine,  hypertension, family history of CAD <60 in father. Symptoms just prior to testing include:  None. Relevant medications:  Amlodipine (Norvasc). PROCEDURE:  The patient performed treadmill exercise using a Cory  protocol, completing 6:24 minutes and completing an estimated workload  of 5.77 metabolic equivalents (METS). The test was terminated due to fatigue and shortness of breath. The heart rate was 83 beats per minute at baseline and increased to 150  beats at peak exercise, which was 100% of the maximum predicted heart  rate. The rest blood pressure was 142/80 mm/Hg and increased to 164/80  mm/Hg, which is a normal response. During the procedure, the patient  developed fatigue, shortness of breath and leg fatigue, but denied chest  discomfort. STRESS ECG RESULTS:  The resting electrocardiogram demonstrated normal  sinus rhythm without definitive ST-segment abnormalities suggestive of  myocardial ischemia.     At peak exercise and during recovery, the patient developed:    Upsloping ST segment changes in leads II, III, AVF, V4, V5, V6, which  did not meet diagnostic criteria for myocardial ischemia with no  premature atrial contractions (PACs) and no premature ventricular  contractions (PVCs). IMPRESSION:  No significant electrocardiographic evidence of myocardial ischemia  during EKG monitoring without significant associated arrhythmias. The patient's Duke Treadmill score is 5 which correlates with a low risk  for significant coronary artery disease. The sensitivity for detecting ischemia on this test may have been  reduced due to the patient being on a calcium channel blocker.         Aung Sams MD    D: 03/09/2023 13:37:13       T: 03/09/2023 13:38:26     [email or web address]  Job#: [id number]     Doc#: Unknown    CC:  VINNY Grande

## 2023-03-09 NOTE — PROGRESS NOTES
Pt denies chest pain/pressure/discomfort/palpitations. Pt is walking halls, steady gait noted. Will continue to monitor.

## 2023-03-09 NOTE — PROGRESS NOTES
Pt d/c'd off unit at this time, ambulatory, with spouse, to home. Belongings in hand. No issues noted.

## 2023-03-09 NOTE — PLAN OF CARE
Problem: Chronic Conditions and Co-morbidities  Goal: Patient's chronic conditions and co-morbidity symptoms are monitored and maintained or improved  3/9/2023 1358 by Jaenth Jones RN  Outcome: Completed  3/9/2023 0018 by Leon Avalos RN  Outcome: Progressing     Problem: Discharge Planning  Goal: Discharge to home or other facility with appropriate resources  3/9/2023 1358 by Janeth Jones RN  Outcome: Completed  3/9/2023 0018 by Leon Avalos RN  Outcome: Progressing     Problem: Safety - Adult  Goal: Free from fall injury  3/9/2023 1358 by Janeth Jones RN  Outcome: Completed  3/9/2023 0018 by Leon Avalos RN  Outcome: Progressing

## 2023-03-09 NOTE — CARE COORDINATION
Case Management Assessment  Initial Evaluation    Date/Time of Evaluation: 3/9/2023 12:07 PM  Assessment Completed by: MATTHEW Doe    If patient is discharged prior to next notation, then this note serves as note for discharge by case management. Patient Name: Yossi Mayo                   YOB: 1950  Diagnosis: Screening for colon cancer [Z12.11]  Gastroesophageal reflux disease with esophagitis, unspecified whether hemorrhage [K21.00]  Heart block [I45.9]                   Date / Time: 3/8/2023 10:42 AM    Patient Admission Status: Observation   Readmission Risk (Low < 19, Mod (19-27), High > 27): No data recorded  Current PCP: NEHEMIAS Tafoya CNP  PCP verified by CM? Yes    Chart Reviewed: Yes      History Provided by: Patient  Patient Orientation: Alert and Oriented, Person, Place, Situation, Self    Patient Cognition: Alert    Hospitalization in the last 30 days (Readmission):  No    If yes, Readmission Assessment in  Navigator will be completed. Advance Directives:      Code Status: Full Code   Patient's Primary Decision Maker is: Legal Next of Kin    Primary Decision MakerRhenriquetomás Foster Spouse - [phone]    Discharge Planning:    Patient lives with: Spouse/Significant Other Type of Home: Apartment  Primary Care Giver: Self  Patient Support Systems include: Spouse/Significant Other, Family Members   Current Financial resources: Medicare  Current community resources: None  Current services prior to admission: None            Current DME:              Type of Home Care services:  None    ADLS  Prior functional level: Independent in ADLs/IADLs  Current functional level: Independent in ADLs/IADLs    PT AM-PAC:   /24  OT AM-PAC:   /24    Family can provide assistance at DC: Yes  Would you like Case Management to discuss the discharge plan with any other family members/significant others, and if so, who?  No  Plans to Return to Present Housing:    Other Identified Issues/Barriers to RETURNING to current housing: none  Potential Assistance needed at discharge: N/A            Potential DME:    Patient expects to discharge to: [facility] for transportation at discharge: Family    Financial    Payor: MEDICARE / Plan: MEDICARE PART A AND B / Product Type: *No Product type* /     Does insurance require precert for SNF: No    Potential assistance Purchasing Medications: No  Meds-to-Beds request:  no      [facility] [phone] - F [phone]  [address] [place] [address]  Phone: [phone] Fax: [phone]    [facility] [id number] [place] [address]  Phone: [phone] Fax: [phone]      Notes:    Factors facilitating achievement of predicted outcomes: Family support, Motivated, Cooperative, and Pleasant    Barriers to discharge: none    Additional Case Management Notes: Patient is  and lives at home with his wife. He uses no medical equipment. The patient's wife does the cooking and the house cleaning. He is independent with his ADL's. Patient manages his own medications and drives. He has no outside services in the home. The discharge plan is home with no services at this time. The Plan for Transition of Care is related to the following treatment goals of Screening for colon cancer [Z12.11]  Gastroesophageal reflux disease with esophagitis, unspecified whether hemorrhage [K21.00]  Heart block [I45.9]    Transportation/Food Security/Housekeeping Addressed: No issues identified or concerns. The Patient and/or Patient Representative Agree with the Discharge Plan? Yes    The discharge plan is home with no services at this time.     Geo Rios  Case Management Department  Ph: [phone]

## 2023-03-09 NOTE — PROGRESS NOTES
Pt returned from Cardiopulmonary, via wheelchair. Now resting in chair, wife visiting. Call light within reach, denies pain, denies chest pressure/palpitations. Will continue to monitor.

## 2023-03-09 NOTE — PROGRESS NOTES
Reassessment and vitals obtained at this time as charted. No changes from previous assessment. Pt denies any further needs. Call light within reach, care ongoing.

## 2023-03-09 NOTE — PROGRESS NOTES
Pt is A&Ox4, calm and cooperative. Pt denies chest pain/discomfort/palpitations. Assmt and VS completed as charted, see flow sheet. Pt independent in room, steady gait noted. Pt up to chair, call light within reach, denies pain, denies further needs. Will continue to monitor.

## 2023-03-09 NOTE — H&P
History and Physical    Patient:  Yung Burr  MRN: [id number]    Chief Complaint: Irregular heartbeat during colonoscopy    History Obtained From:  patient, electronic medical record    PCP: NEHEMIAS Fajardo CNP    History of Present Illness: The patient is a 72 y.o. male who presented as an admission from PACU due to reported episodes of complete heart block during his colonoscopy. Patient stated that he presented for an elective colonoscopy for screening. Patient denies any previous history of cardiac arrhythmias. He denied history of chest pain palpitations or syncope. He does have history of hypertension. He denied any smoking history. Per CRNA notes patient was reported to have several episodes of complete heart block but attempted to grab an EKG but was unsuccessful in capturing it. No strips were printed from monitors within the procedure room. Past Medical History:        Diagnosis Date    Cancer ([address]     skin    GERD (gastroesophageal reflux disease)     Hearing loss     High cholesterol     Hypertension     Stroke [facility])     Unspecified cerebral artery occlusion with cerebral infarction        Past Surgical History:        Procedure Laterality Date    COLONOSCOPY  2012    COLONOSCOPY N/A 3/8/2023    COLONOSCOPY POLYPECTOMY REMOVAL HOT SNARE / COLD BIOPSIES performed by Wallace Feliciano MD at [address] [facility]  2012    PROSTATE SURGERY      SKIN CANCER DESTRUCTION  01/01/2008    SKIN CANCER EXCISION  01/01/2008    UPPER GASTROINTESTINAL ENDOSCOPY N/A 3/8/2023    EGD BIOPSY performed by Wallace Feliciano MD at [address]       Medications Prior to Admission:    Prior to Admission medications    Medication Sig Start Date End Date Taking?  Authorizing Provider   omeprazole (PRILOSEC) 20 MG delayed release capsule Take 1 capsule by mouth every morning (before breakfast) 3/8/23 4/7/23 Yes Wallace Feliciano MD   Zolpidem Tartrate (AMBIEN PO) Take 10 mg by mouth nightly   Yes Historical Provider, MD   Multiple Vitamins-Minerals (THERAPEUTIC MULTIVITAMIN-MINERALS) tablet Take 1 tablet by mouth daily   Yes Historical Provider, MD   atorvastatin (LIPITOR) 20 MG tablet Take 1 tablet by mouth daily 10/28/22   NEHEMIAS Smith CNP   losartan (COZAAR) 100 MG tablet TAKE 1 TABLET EVERY DAY 9/19/22   NEHEMIAS Smith CNP   amLODIPine (NORVASC) 10 MG tablet TAKE 1 TABLET EVERY DAY 9/15/22   NEHEMIAS Smith CNP   aspirin 81 MG tablet Take 81 mg by mouth daily. Historical Provider, MD   dutasteride (AVODART) 0.5 MG capsule Take 0.5 mg by mouth three times a week. Historical Provider, MD       Allergies:  Pcn [penicillins]    Social History:   TOBACCO:   reports that he has quit smoking. His smoking use included cigarettes. He started smoking about 19 years ago. He has a 15.00 pack-year smoking history. He has never used smokeless tobacco.  ETOH:   reports current alcohol use of about 1.0 standard drink per week. Family History:       Problem Relation Age of Onset    Colon Cancer Mother     Alzheimer's Disease Mother     Heart Disease Father     Heart Disease Brother     Alzheimer's Disease Maternal Grandmother     Cancer Maternal Grandfather         prostate       Allergies:  Pcn [penicillins]    Medications Prior to Admission:    Prior to Admission medications    Medication Sig Start Date End Date Taking?  Authorizing Provider   omeprazole (PRILOSEC) 20 MG delayed release capsule Take 1 capsule by mouth every morning (before breakfast) 3/8/23 4/7/23 Yes Heidi Cervantes MD   Zolpidem Tartrate (AMBIEN PO) Take 10 mg by mouth nightly   Yes Historical Provider, MD   Multiple Vitamins-Minerals (THERAPEUTIC MULTIVITAMIN-MINERALS) tablet Take 1 tablet by mouth daily   Yes Historical Provider, MD   atorvastatin (LIPITOR) 20 MG tablet Take 1 tablet by mouth daily 10/28/22   NEHEMIAS Smith CNP   losartan (COZAAR) 100 MG tablet TAKE 1 TABLET EVERY DAY 9/19/22   NEHEMIAS Yen CNP   amLODIPine (NORVASC) 10 MG tablet TAKE 1 TABLET EVERY DAY 9/15/22   NEHEMIAS Yen CNP   aspirin 81 MG tablet Take 81 mg by mouth daily. Historical Provider, MD   dutasteride (AVODART) 0.5 MG capsule Take 0.5 mg by mouth three times a week. Historical Provider, MD       Review of Systems:  Constitutional:negative  for fevers, and negative for chills. Eyes: negative for visual disturbance   ENT: negative for sore throat, negative nasal congestion, and negative for earache  Respiratory: negative for shortness of breath, negative for cough, and negative for wheezing  Cardiovascular: negative for chest pain, negative for palpitations, and negative for syncope  Gastrointestinal: negative for abdominal pain, negative for nausea,negative for vomiting, negative for diarrhea, negative for constipation, and negative for hematochezia or melena  Genitourinary: negative for dysuria, negative for urinary urgency, negative for urinary frequency, and negative for hematuria  Skin: negative for skin rash, and negative for skin lesions  Neurological: negative for unilateral weakness, numbness or tingling. Physical Exam:    Vitals:   Temp: 97.1 °F (36.2 °C)  BP: 104/63  Resp: 18  Heart Rate: 74  SpO2: 95 %  24HR INTAKE/OUTPUT:    Intake/Output Summary (Last 24 hours) at 3/9/2023 0753  Last data filed at 3/9/2023 2664  Gross per 24 hour   Intake 1600.5 ml   Output 1200 ml   Net 400.5 ml       Weight    Body mass index is 33.33 kg/m². Exam:  GEN:    Awake, alert and oriented x3. EYES:  EOMI, pupils equal   NECK: Supple. No lymphadenopathy. No carotid bruit  CVS:    regular rate and rhythm, no audible murmur  PULM:  CTA, no wheezes, rales or rhonchi, no acute respiratory distress  ABD:    Bowels sounds normal.  Abdomen is soft. No distention. no tenderness to palpation. EXT:   no edema bilaterally . No calf tenderness. NEURO: Moves all extremities.   Motor and sensory are grossly intact  SKIN:  No rashes. No skin lesions.    -----------------------------------------------------------------  Diagnostic Data:     DATA:    CBC:   Lab Results   Component Value Date    WBC 6.3 03/09/2023    RBC 4.73 03/09/2023    HGB 14.9 03/09/2023    HCT 42.9 03/09/2023    MCV 90.7 03/09/2023     03/09/2023        CMP:   Lab Results   Component Value Date    GLUCOSE 101 (H) 03/09/2023    BUN 13 03/09/2023    CREATININE 0.80 03/09/2023     03/09/2023    K 3.7 03/09/2023    CALCIUM 9.2 03/09/2023     03/09/2023    CO2 25 03/09/2023    PROT 7.0 03/09/2023    LABALBU 4.2 03/09/2023    BILITOT 0.5 03/09/2023    ALKPHOS 47 03/09/2023    ALT 44 (H) 03/09/2023    AST 28 03/09/2023       UA:   No results found for: COLORU, CLARITYU, SPECGRAV, WBCUA, RBCUA, EPITHUA, LEUKOCYTESUR, GLUCOSEU, BLOODU, KETUA, PROTEINU, HGBUR, CASTUA, CRYSTUA, BACTERIA, YEAST    Lactic Acid:   No results found for: LACTA    D-Dimer:  No results found for: DDIMER    PT/INR:  No results found for: PROTIME, INR    High Sensitivity Troponin:  No results for input(s): TROPHS in the last 72 hours. ABGs:   No results found for: PHART, PH, HLM4HMC, PCO2, PO2ART, PO2, ODZ3LXR, HCO3, BEART, BE, THGBART, THB, QZO9XHN, G4GWVSNL, O2SAT, FIO2        No orders to display           Assessment:    Principal Problem:    Heart block  Active Problems:    Polyp of colon    Hiatal hernia    Luan lesion, acute    Gastritis and duodenitis    Acute erosion of duodenum  Resolved Problems:    * No resolved hospital problems.  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/22/2022    Morbidly obese ([address] 05/05/2020    Unspecified hyperplasia of prostate without urinary obstruction and other lower urinary tract symptoms (LUTS) 02/10/2015    Insomnia 02/10/2015    Vertigo, intermittent 08/20/2013    Meniere syndrome 08/20/2013    Hypertension 08/20/2013    Hyperlipidemia 08/20/2013    Encounter for counseling 08/20/2013       Plan:     MEDICAL DECISION MAKING:    Primary Problem(s): Heart block  Differential diagnoses: Cardiac arrhythmia  Condition is an undiagnosed new problem with uncertain prognosis  Condition is resolved  Treatment plan: Consultation: Cardiology  Imaging:   Cardiac stress test today treadmill only  Echocardiogram  Medications: Medications not indicated at this time  Medication Monitoring / High Risk Medications: none      Nutrition status: Well developed, well nourished with no malnutrition  Dietician consult initiated    Hospital Prophylaxis:   DVT: Lovenox   Stress Ulcer:  None      MDM Data:   Test interpretation:  My independent EKG interpretation: normal sinus rhythm  My independent X-ray interpretation:  not applicable  Management and/or test interpretation discussed with Dr. Katya Loja, Cardiology  Consults and Nursing notes were personally reviewed, all current labs and imaging were personally reviewed, tests ordered: CBC, BMP, and history obtained by independent historian       Disposition:  Shared decision making:  All test results, treatment options and disposition options were discussed with the patient today  Social determinants of health that may impact management: none  Code status: Full Code   Disposition: Discharge plan is home today pending stress test and echocardiogram        Critical Care Time:  Total critical care time caring for this patient with life threatening, unstable organ failure, including direct patient contact, management of life support systems, review of data including imaging and labs, discussions with other team members and physicians at least 0 minutes so far today, excluding separately billable procedures. [facility] Medication Reconciliation documentation:    [x] I have utilized all available immediate resources to obtain, update, or review the patient's current medications (including all prescriptions, over-the-counter products, herbals, cannabinoid products and bitamin/mineral/dietary/nutritional supplements. [employer].Needs 'yes\", Francine [employer]age     []  The patient is not eligible for medication reconciliation; the patient is in an emergent medical situation where delaying treatment would jeopardize the patient's health    []  I did NOT confirm, update or review the patient's current list of medications today. [DOES NOT SATISFY [facility] PERFORMANCE]        [facility] Advanced Care Planning documentation:  [x] I have confirmed that the patient's Advance Care Plan is present, Code Status is documented, or surrogate decision maker is listed in the patient's medical record  [If \"yes\", STOP HERE]     [] The patient's Advance Care Plan is NOT present because:    []  I confirmed today that the patient does not wish or was not able to name a   surrogate decision maker or provide and advance care plan.    [] Hospice care is currently being provided or has been provided within the   calendar year. []  I did NOT confirm today the presence of an [address] or surrogate   decision maker documented within the patient's medical record. [DOES NOT SATISFY [facility] PERFORMANCE]    CORE MEASURES  DVT prophylaxis: Lovenox  Decubitus ulcer present on admission: No  CODE STATUS: FULL CODE  Nutrition Status: good   Physical therapy: No   Old Charts reviewed: Yes  EKG Reviewed:  Yes  Advance Directive Addressed: Yes    NEHEMIAS Reich - AUGUST, NEHEMIAS, NP-C  3/9/2023, 7:53 AM

## 2023-03-09 NOTE — PROGRESS NOTES
Pt to Cardiopulmonary for order test via wheelchair, Ben Barba RN, accompanied. Will continue to monitor.

## 2023-03-09 NOTE — DISCHARGE SUMMARY
Discharge Summary    Doug Whipple  :  1950  MRN:  [id number]    Admit date:  3/8/2023      Discharge date: 3/9/2023     Admitting Physician:  Sherman Hammans, MD    Discharge Diagnoses:    Principal Problem:    Heart block  Active Problems:    Polyp of colon    Hiatal hernia    Mahendra Been lesion, acute    Gastritis and duodenitis    Acute erosion of duodenum  Resolved Problems:    * No resolved hospital problems. *      Hospital Course:   Doug Whipple is a 72 y.o. male admitted with heart block. He presented as an admission from PACU due to reported episodes of complete heart block during his colonoscopy. Patient stated that he presented for an elective colonoscopy for screening. Patient denies any previous history of cardiac arrhythmias. He denied history of chest pain palpitations or syncope. He does have history of hypertension. He denied any smoking history. Per CRNA notes patient was reported to have several episodes of complete heart block but attempted to grab an EKG but was unsuccessful in capturing it. No strips were printed from monitors within the procedure room. Patient was admitted cardiology was consulted and echocardiogram was completed as well as treadmill only stress test.  Patient tolerated procedures well. Echocardiogram negative. No ectopy is's been seen since admission. Plan will be to discharge patient home today he will have a vital connect monitor placed and to wear for 2 weeks. He will also have a home sleep study completed and follow-up with cardiology as outpatient. No further medication changes made at this time. Consultants:  Dr. Hu Vega, cardiology    Procedures: Stress Test    Complications: none    Discharge Condition: fair    Exam:  GEN:    Awake, alert and oriented x3. EYES:   EOMI, pupils equal   NECK: Supple. No lymphadenopathy.   No carotid bruit  CVS:     regular rate and rhythm, no audible murmur  PULM:  CTA, no wheezes, rales or rhonchi, no acute respiratory distress  ABD:     Bowels sounds normal.  Abdomen is soft. No distention. no tenderness to palpation. EXT:     no edema bilaterally . No calf tenderness. NEURO: Moves all extremities. Motor and sensory are grossly intact  SKIN:    No rashes. No skin lesions. Significant Diagnostic Studies:   Lab Results   Component Value Date    WBC 6.3 03/09/2023    HGB 14.9 03/09/2023     03/09/2023       Lab Results   Component Value Date    BUN 13 03/09/2023    CREATININE 0.80 03/09/2023     03/09/2023    K 3.7 03/09/2023    CALCIUM 9.2 03/09/2023     03/09/2023    CO2 25 03/09/2023    LABGLOM >60 03/09/2023       No results found for: Cheron Neeraj, EPITHUA, LEUKOCYTESUR, SPECGRAV, GLUCOSEU, KETUA, PROTEINU, HGBUR, CASTUA, CRYSTUA, BACTERIA, YEAST    No results found.     Assessment and Plan:  Patient Active Problem List    Diagnosis Date Noted    Polyp of colon 03/08/2023    Hiatal hernia 03/08/2023    Luan lesion, acute 03/08/2023    Gastritis and duodenitis 03/08/2023    Acute erosion of duodenum 03/08/2023    Heart block 03/08/2023    Chronic interstitial cystitis 04/22/2022    Enlarged prostate 04/22/2022    Impotence of organic origin 04/22/2022    Morbidly obese ([facility] [address] 05/05/2020    Unspecified hyperplasia of prostate without urinary obstruction and other lower urinary tract symptoms (LUTS) 02/10/2015    Insomnia 02/10/2015    Vertigo, intermittent 08/20/2013    Meniere syndrome 08/20/2013    Hypertension 08/20/2013    Hyperlipidemia 08/20/2013    Encounter for counseling 08/20/2013        Discharge Medications:         Medication List        START taking these medications      omeprazole 20 MG delayed release capsule  Commonly known as: PRILOSEC  Take 1 capsule by mouth every morning (before breakfast)            CONTINUE taking these medications      AMBIEN PO     amLODIPine 10 MG tablet  Commonly known as: NORVASC  TAKE 1 TABLET EVERY DAY     aspirin 81 MG tablet atorvastatin 20 MG tablet  Commonly known as: LIPITOR  Take 1 tablet by mouth daily     dutasteride 0.5 MG capsule  Commonly known as: AVODART     losartan 100 MG tablet  Commonly known as: COZAAR  TAKE 1 TABLET EVERY DAY     therapeutic multivitamin-minerals tablet               Where to Get Your Medications        These medications were sent to [facility] [id number] [address] [address]      Phone: [phone]   omeprazole 20 MG delayed release capsule         Patient Instructions:    Activity: activity as tolerated  Diet: cardiac diet  Wound Care: none needed  Other: Vital connect monitor and sleep study    Disposition:   Discharge to Home    Follow up:  Patient will be followed by NEHEMIAS Fajardo CNP in 1-2 weeks    CORE MEASURES on Discharge (if applicable)  ACE/ARB in CHF: NA  Statin in MI: NA  ASA in MI: NA  Statin in CVA: NA  Antiplatelet in CVA: NA    Total time spent on discharge services: 40 minutes    Including the following activities:  Evaluation and Management of patient  Discussion with patient and/or surrogate about current care plan  Coordination with Case Management and/or   Coordination of care with Consultants (if applicable)   Coordination of care with Receiving Facility Physician (if applicable)  Completion of DME forms (if applicable)  Preparation of Discharge Summary  Preparation of Medication Reconciliation  Preparation of Discharge Prescriptions    Signed:  NEHEMIAS Solares CNP, NEHEMIAS, NP-C  3/9/2023, 1:47 PM

## 2023-03-09 NOTE — PROGRESS NOTES
Writer reviewed discharge instructions with patient and spouse. Patient aware of need to  prescription from [facility]. Reviewed follow up appointments with patient. Reviewed instructions from surgery. Patient verbalized understanding. Denies questions. Writer educated patient on the different heart blocks and provided handout. Patient and spouse denied questions. Copy of discharge instructions given to patient.

## 2023-03-09 NOTE — DISCHARGE INSTR - DIET
Good nutrition is important when healing from an illness, injury, or surgery. Follow any nutrition recommendations given to you during your hospital stay. If you were given an oral nutrition supplement while in the hospital, continue to take this supplement at home. You can take it with meals, in-between meals, and/or before bedtime. These supplements can be purchased at most local grocery stores, pharmacies, and chain [employer]-stores. If you have any questions about your diet or nutrition, call the hospital and ask for the dietitian.   Cardiac:  low salt, low fat, low cholesterol

## 2023-03-10 LAB — SURGICAL PATHOLOGY REPORT: NORMAL

## 2023-04-03 ENCOUNTER — OFFICE VISIT (OUTPATIENT)
Dept: GASTROENTEROLOGY | Age: 73
End: 2023-04-03
Payer: MEDICARE

## 2023-04-03 VITALS
HEART RATE: 81 BPM | HEIGHT: 66 IN | WEIGHT: 219.2 LBS | DIASTOLIC BLOOD PRESSURE: 82 MMHG | SYSTOLIC BLOOD PRESSURE: 134 MMHG | RESPIRATION RATE: 18 BRPM | BODY MASS INDEX: 35.23 KG/M2 | TEMPERATURE: 97.8 F

## 2023-04-03 DIAGNOSIS — K21.9 GASTROESOPHAGEAL REFLUX DISEASE WITHOUT ESOPHAGITIS: Primary | ICD-10-CM

## 2023-04-03 PROCEDURE — 1036F TOBACCO NON-USER: CPT | Performed by: INTERNAL MEDICINE

## 2023-04-03 PROCEDURE — 99212 OFFICE O/P EST SF 10 MIN: CPT | Performed by: INTERNAL MEDICINE

## 2023-04-03 PROCEDURE — G8417 CALC BMI ABV UP PARAM F/U: HCPCS | Performed by: INTERNAL MEDICINE

## 2023-04-03 PROCEDURE — G8427 DOCREV CUR MEDS BY ELIG CLIN: HCPCS | Performed by: INTERNAL MEDICINE

## 2023-04-03 PROCEDURE — 3078F DIAST BP <80 MM HG: CPT | Performed by: INTERNAL MEDICINE

## 2023-04-03 PROCEDURE — 1123F ACP DISCUSS/DSCN MKR DOCD: CPT | Performed by: INTERNAL MEDICINE

## 2023-04-03 PROCEDURE — 3017F COLORECTAL CA SCREEN DOC REV: CPT | Performed by: INTERNAL MEDICINE

## 2023-04-03 PROCEDURE — 3074F SYST BP LT 130 MM HG: CPT | Performed by: INTERNAL MEDICINE

## 2023-04-03 NOTE — PROGRESS NOTES
SURGERY      SKIN CANCER DESTRUCTION  01/01/2008    SKIN CANCER EXCISION  01/01/2008    UPPER GASTROINTESTINAL ENDOSCOPY N/A 03/08/2023    EGD BIOPSY performed by Patrice Marin MD at [facility]        Family History   Problem Relation Age of Onset    Colon Cancer Mother     Alzheimer's Disease Mother     Heart Disease Father     Heart Disease Brother     Alzheimer's Disease Maternal Grandmother     Cancer Maternal Grandfather         prostate        Review of Systems   HENT:  Positive for hearing loss. Cardiovascular:         Hypertension   Gastrointestinal:         Acid reflux   Neurological:         Stroke   All other systems reviewed and are negative. /82 (Site: Left Upper Arm, Position: Sitting, Cuff Size: Large Adult)   Pulse 81   Temp 97.8 °F (36.6 °C) (Temporal)   Resp 18   Ht 5' 6\" (1.676 m)   Wt 219 lb 3.2 oz (99.4 kg)   BMI 35.38 kg/m²      Physical Exam  Constitutional:       Appearance: Normal appearance. HENT:      Head: Normocephalic. Right Ear: External ear normal.      Left Ear: External ear normal.      Nose: Nose normal.      Mouth/Throat:      Mouth: Mucous membranes are moist.   Eyes:      Extraocular Movements: Extraocular movements intact. Pupils: Pupils are equal, round, and reactive to light. Cardiovascular:      Rate and Rhythm: Normal rate and regular rhythm. Pulses: Normal pulses. Pulmonary:      Effort: Pulmonary effort is normal.      Breath sounds: Normal breath sounds. Abdominal:      General: Bowel sounds are normal.      Palpations: Abdomen is soft. Musculoskeletal:         General: Normal range of motion. Cervical back: Normal range of motion. Skin:     General: Skin is warm. Neurological:      General: No focal deficit present. Mental Status: He is alert and oriented to person, place, and time.    Psychiatric:         Mood and Affect: Mood normal.        Lab Results   Component Value Date    WBC 6.3 03/09/2023    HGB 14.9

## 2023-04-03 NOTE — PATIENT INSTRUCTIONS
SURVEY:    You may be receiving a survey from [employer] regarding your visit today. Please complete the survey to enable us to provide the highest quality of care to you and your family. If you cannot score us a very good on any question, please call the office to discuss how we could have made your experience a very good one. Thank you.

## 2023-04-26 ENCOUNTER — HOSPITAL ENCOUNTER (OUTPATIENT)
Age: 73
Setting detail: SPECIMEN
Discharge: HOME | End: 2023-04-26

## 2023-04-26 DIAGNOSIS — G47.00 INSOMNIA, UNSPECIFIED TYPE: ICD-10-CM

## 2023-04-26 DIAGNOSIS — E78.5 HYPERLIPIDEMIA, UNSPECIFIED HYPERLIPIDEMIA TYPE: ICD-10-CM

## 2023-04-26 DIAGNOSIS — Z51.81 ENCOUNTER FOR THERAPEUTIC DRUG LEVEL MONITORING: ICD-10-CM

## 2023-04-26 DIAGNOSIS — I10 ESSENTIAL HYPERTENSION: ICD-10-CM

## 2023-04-26 DIAGNOSIS — Z12.5 SCREENING FOR PROSTATE CANCER: ICD-10-CM

## 2023-04-26 DIAGNOSIS — R79.9 ABNORMAL FINDING OF BLOOD CHEMISTRY, UNSPECIFIED: ICD-10-CM

## 2023-04-26 LAB
ALBUMIN SERPL-MCNC: 4.5 G/DL (ref 3.5–5.2)
ALBUMIN/GLOBULIN RATIO: 1.5 (ref 1–2.5)
ALP SERPL-CCNC: 47 U/L (ref 40–129)
ALT SERPL-CCNC: 38 U/L (ref 5–41)
ANION GAP SERPL CALCULATED.3IONS-SCNC: 15 MMOL/L (ref 9–17)
AST SERPL-CCNC: 30 U/L
BILIRUB SERPL-MCNC: 0.4 MG/DL (ref 0.3–1.2)
BUN SERPL-MCNC: 15 MG/DL (ref 8–23)
CALCIUM SERPL-MCNC: 10.1 MG/DL (ref 8.6–10.4)
CHLORIDE SERPL-SCNC: 99 MMOL/L (ref 98–107)
CHOLEST SERPL-MCNC: 142 MG/DL
CHOLESTEROL/HDL RATIO: 4.2
CO2 SERPL-SCNC: 25 MMOL/L (ref 20–31)
CREAT SERPL-MCNC: 0.99 MG/DL (ref 0.7–1.2)
GFR SERPL CREATININE-BSD FRML MDRD: >60 ML/MIN/1.73M2
GLUCOSE SERPL-MCNC: 107 MG/DL (ref 70–99)
HCT VFR BLD AUTO: 45.9 % (ref 40.7–50.3)
HDLC SERPL-MCNC: 34 MG/DL
HGB BLD-MCNC: 14.9 G/DL (ref 13–17)
LDLC SERPL CALC-MCNC: 75 MG/DL (ref 0–130)
MCH RBC QN AUTO: 30.3 PG (ref 25.2–33.5)
MCHC RBC AUTO-ENTMCNC: 32.5 G/DL (ref 28.4–34.8)
MCV RBC AUTO: 93.3 FL (ref 82.6–102.9)
NRBC AUTOMATED: 0 PER 100 WBC
PDW BLD-RTO: 12.5 % (ref 11.8–14.4)
PLATELET # BLD AUTO: 219 K/UL (ref 138–453)
PMV BLD AUTO: 9.2 FL (ref 8.1–13.5)
POTASSIUM SERPL-SCNC: 4.2 MMOL/L (ref 3.7–5.3)
PROSTATE SPECIFIC ANTIGEN: 0.44 NG/ML
PROT SERPL-MCNC: 7.6 G/DL (ref 6.4–8.3)
RBC # BLD: 4.92 M/UL (ref 4.21–5.77)
SODIUM SERPL-SCNC: 139 MMOL/L (ref 135–144)
TRIGL SERPL-MCNC: 167 MG/DL
WBC # BLD AUTO: 5.7 K/UL (ref 3.5–11.3)

## 2023-04-27 LAB
ALCOHOL URINE: NEGATIVE MG/DL
AMPHETAMINE SCREEN URINE: NEGATIVE NG/ML
BARBITURATE SCREEN URINE: NEGATIVE NG/ML
BENZODIAZEPINE SCREEN, URINE: NEGATIVE NG/ML
CANNABINOID SCREEN URINE: NEGATIVE NG/ML
COCAINE(METAB.)SCREEN, URINE: NEGATIVE NG/ML
CREATININE URINE: 61.6 MG/DL (ref 20–400)
EST. AVERAGE GLUCOSE BLD GHB EST-MCNC: 128 MG/DL
HBA1C MFR BLD: 6.1 % (ref 4–6)
Lab: NORMAL
METHADONE SCREEN, URINE: NEGATIVE NG/ML
OPIATES, URINE: POSITIVE NG/ML
PHENCYCLIDINE, URINE: NEGATIVE NG/ML
PROPOXYPHENE, URINE: NEGATIVE NG/ML

## 2023-04-29 LAB
CODEINE, URINE: <20 NG/ML
HYDROCODONE, URINE CONFIRMATION: 203 NG/ML
HYDROMORPHONE, URINE CONFIRMATION: <20 NG/ML
MORPHINE, URINE CONFIRMATION: <20 NG/ML
NORHYDROCODONE, URINE: 344 NG/ML
NOROXYCODONE, URINE: <20 NG/ML
NOROXYMORPHONE, URINE: <20 NG/ML
OPIATE, 6-AM URINE: <10 NG/ML
OXYCODONE, URINE CONFIRMATION: <20 NG/ML
OXYMORPHONE, URINE CONFIRMATION: <20 NG/ML

## 2023-11-10 ENCOUNTER — HOSPITAL ENCOUNTER (OUTPATIENT)
Age: 73
Setting detail: SPECIMEN
Discharge: HOME | End: 2023-11-10

## 2023-11-10 DIAGNOSIS — R73.03 PREDIABETES: ICD-10-CM

## 2023-11-10 DIAGNOSIS — I10 ESSENTIAL HYPERTENSION: ICD-10-CM

## 2023-11-10 DIAGNOSIS — Z51.81 ENCOUNTER FOR THERAPEUTIC DRUG LEVEL MONITORING: ICD-10-CM

## 2023-11-10 DIAGNOSIS — E78.5 HYPERLIPIDEMIA, UNSPECIFIED HYPERLIPIDEMIA TYPE: ICD-10-CM

## 2023-11-10 DIAGNOSIS — G47.00 INSOMNIA, UNSPECIFIED TYPE: ICD-10-CM

## 2023-11-10 DIAGNOSIS — R79.9 ABNORMAL FINDING OF BLOOD CHEMISTRY, UNSPECIFIED: ICD-10-CM

## 2023-11-10 LAB
ALBUMIN SERPL-MCNC: 4.3 G/DL (ref 3.5–5.2)
ALBUMIN/GLOB SERPL: 1.3 {RATIO} (ref 1–2.5)
ALP SERPL-CCNC: 54 U/L (ref 40–129)
ALT SERPL-CCNC: 61 U/L (ref 5–41)
AMPHET UR QL SCN: NEGATIVE
ANION GAP SERPL CALCULATED.3IONS-SCNC: 13 MMOL/L (ref 9–17)
AST SERPL-CCNC: 43 U/L
BARBITURATES UR QL SCN: NEGATIVE
BENZODIAZ UR QL: NEGATIVE
BILIRUB SERPL-MCNC: 0.4 MG/DL (ref 0.3–1.2)
BUN SERPL-MCNC: 11 MG/DL (ref 8–23)
CALCIUM SERPL-MCNC: 9.9 MG/DL (ref 8.6–10.4)
CANNABINOIDS UR QL SCN: NEGATIVE
CHLORIDE SERPL-SCNC: 99 MMOL/L (ref 98–107)
CHOLEST SERPL-MCNC: 145 MG/DL
CHOLESTEROL/HDL RATIO: 5
CO2 SERPL-SCNC: 24 MMOL/L (ref 20–31)
COCAINE UR QL SCN: NEGATIVE
CREAT SERPL-MCNC: 0.9 MG/DL (ref 0.7–1.2)
ERYTHROCYTE [DISTWIDTH] IN BLOOD BY AUTOMATED COUNT: 12.2 % (ref 11.8–14.4)
EST. AVERAGE GLUCOSE BLD GHB EST-MCNC: 128 MG/DL
FENTANYL UR QL: NEGATIVE
GFR SERPL CREATININE-BSD FRML MDRD: >60 ML/MIN/1.73M2
GLUCOSE SERPL-MCNC: 161 MG/DL (ref 70–99)
HBA1C MFR BLD: 6.1 % (ref 4–6)
HCT VFR BLD AUTO: 46.3 % (ref 40.7–50.3)
HDLC SERPL-MCNC: 29 MG/DL
HGB BLD-MCNC: 15.4 G/DL (ref 13–17)
LDLC SERPL CALC-MCNC: 53 MG/DL (ref 0–130)
MCH RBC QN AUTO: 30.4 PG (ref 25.2–33.5)
MCHC RBC AUTO-ENTMCNC: 33.3 G/DL (ref 28.4–34.8)
MCV RBC AUTO: 91.5 FL (ref 82.6–102.9)
METHADONE UR QL: NEGATIVE
NRBC BLD-RTO: 0 PER 100 WBC
OPIATES UR QL SCN: NEGATIVE
OXYCODONE UR QL SCN: NEGATIVE
PCP UR QL SCN: NEGATIVE
PLATELET # BLD AUTO: 215 K/UL (ref 138–453)
PMV BLD AUTO: 9.4 FL (ref 8.1–13.5)
POTASSIUM SERPL-SCNC: 4.2 MMOL/L (ref 3.7–5.3)
PROT SERPL-MCNC: 7.5 G/DL (ref 6.4–8.3)
RBC # BLD AUTO: 5.06 M/UL (ref 4.21–5.77)
SODIUM SERPL-SCNC: 136 MMOL/L (ref 135–144)
TEST INFORMATION: NORMAL
TRIGL SERPL-MCNC: 313 MG/DL
WBC OTHER # BLD: 6.5 K/UL (ref 3.5–11.3)

## 2023-11-29 ENCOUNTER — HOSPITAL ENCOUNTER (OUTPATIENT)
Dept: ULTRASOUND IMAGING | Age: 73
Discharge: HOME | End: 2023-12-01
Payer: MEDICARE

## 2023-11-29 DIAGNOSIS — R79.89 ELEVATED LFTS: ICD-10-CM

## 2023-11-29 PROCEDURE — 76705 ECHO EXAM OF ABDOMEN: CPT

## 2023-12-01 NOTE — RESULT ENCOUNTER NOTE
Please call pt and inform them their ultrasound shows gallstones and hepatic steatosis. Recommend eval by GI for further eval at [facility].

## 2023-12-07 ENCOUNTER — OFFICE VISIT (OUTPATIENT)
Dept: GASTROENTEROLOGY | Age: 73
End: 2023-12-07
Payer: MEDICARE

## 2023-12-07 VITALS
DIASTOLIC BLOOD PRESSURE: 72 MMHG | OXYGEN SATURATION: 97 % | BODY MASS INDEX: 34.65 KG/M2 | RESPIRATION RATE: 18 BRPM | HEIGHT: 66 IN | SYSTOLIC BLOOD PRESSURE: 128 MMHG | WEIGHT: 215.6 LBS | HEART RATE: 76 BPM

## 2023-12-07 DIAGNOSIS — K76.0 HEPATIC STEATOSIS: Primary | ICD-10-CM

## 2023-12-07 DIAGNOSIS — Z11.59 ENCOUNTER FOR SCREENING FOR OTHER VIRAL DISEASES: ICD-10-CM

## 2023-12-07 DIAGNOSIS — E66.9 OBESITY, CLASS I, BMI 30-34.9: ICD-10-CM

## 2023-12-07 PROCEDURE — G8484 FLU IMMUNIZE NO ADMIN: HCPCS | Performed by: NURSE PRACTITIONER

## 2023-12-07 PROCEDURE — 1036F TOBACCO NON-USER: CPT | Performed by: NURSE PRACTITIONER

## 2023-12-07 PROCEDURE — 1123F ACP DISCUSS/DSCN MKR DOCD: CPT | Performed by: NURSE PRACTITIONER

## 2023-12-07 PROCEDURE — G8417 CALC BMI ABV UP PARAM F/U: HCPCS | Performed by: NURSE PRACTITIONER

## 2023-12-07 PROCEDURE — 3078F DIAST BP <80 MM HG: CPT | Performed by: NURSE PRACTITIONER

## 2023-12-07 PROCEDURE — 99214 OFFICE O/P EST MOD 30 MIN: CPT | Performed by: NURSE PRACTITIONER

## 2023-12-07 PROCEDURE — 3074F SYST BP LT 130 MM HG: CPT | Performed by: NURSE PRACTITIONER

## 2023-12-07 PROCEDURE — 3017F COLORECTAL CA SCREEN DOC REV: CPT | Performed by: NURSE PRACTITIONER

## 2023-12-07 PROCEDURE — G8428 CUR MEDS NOT DOCUMENT: HCPCS | Performed by: NURSE PRACTITIONER

## 2023-12-07 ASSESSMENT — ENCOUNTER SYMPTOMS: TROUBLE SWALLOWING: 0

## 2023-12-07 NOTE — PATIENT INSTRUCTIONS
SURVEY:    You may be receiving a survey from [employer] regarding your visit today. You may get this in the mail, through your MyChart, or in your email. Please complete the survey to enable us to provide the highest quality of care to you and your family. If you cannot score us a very good (5 Stars) on any question, please call the office to discuss how we could of made your experience exceptional.    Thank you!     MD Remy Arzola, NEHEMIAS-ERIKA Moncada LPN    Phone: [phone]  Fax: [phone]    Office Hours:   M-TH 8-5, F: 8-12

## 2023-12-11 ENCOUNTER — HOSPITAL ENCOUNTER (OUTPATIENT)
Age: 73
Setting detail: SPECIMEN
Discharge: HOME | End: 2023-12-11

## 2023-12-11 DIAGNOSIS — K76.0 HEPATIC STEATOSIS: ICD-10-CM

## 2023-12-11 DIAGNOSIS — Z11.59 ENCOUNTER FOR SCREENING FOR OTHER VIRAL DISEASES: ICD-10-CM

## 2023-12-11 LAB
CERULOPLASMIN SERPL-MCNC: 22 MG/DL (ref 15–30)
INR PPP: 1
PROTHROMBIN TIME: 12.7 SEC (ref 11.7–14.9)
TRANSFERRIN SERPL-MCNC: 310 MG/DL (ref 200–360)

## 2023-12-12 ENCOUNTER — TELEPHONE (OUTPATIENT)
Dept: GASTROENTEROLOGY | Age: 73
End: 2023-12-12

## 2023-12-12 NOTE — TELEPHONE ENCOUNTER
----- Message from NEHEMIAS Marquez CNP sent at 12/11/2023  3:58 PM EST -----  Please notify patient that their lab results are normal.

## 2023-12-13 LAB — SMOOTH MUSCLE ANTIBODY: 7 UNITS (ref 0–19)

## 2023-12-14 LAB
ANA SER QL IA: NEGATIVE
DSDNA IGG SER QL IA: 0.8 IU/ML
FERRITIN SERPL-MCNC: 76 NG/ML (ref 30–400)
HAV AB SERPL IA-ACNC: NONREACTIVE
HAV IGM SERPL QL IA: NONREACTIVE
HBV CORE AB SER QL: NONREACTIVE
HBV CORE IGM SERPL QL IA: NONREACTIVE
HBV SURFACE AB SERPL IA-ACNC: <3.5 MIU/ML
HBV SURFACE AG SERPL QL IA: NONREACTIVE
HCV AB SERPL QL IA: NONREACTIVE
IRON SATN MFR SERPL: 32 % (ref 20–55)
IRON SERPL-MCNC: 113 UG/DL (ref 59–158)
NUCLEAR IGG SER IA-RTO: 0.4 U/ML
TIBC SERPL-MCNC: 348 UG/DL (ref 250–450)
UNSATURATED IRON BINDING CAPACITY: 235 UG/DL (ref 112–347)

## 2023-12-15 ENCOUNTER — TELEPHONE (OUTPATIENT)
Dept: GASTROENTEROLOGY | Age: 73
End: 2023-12-15

## 2023-12-15 LAB
ALPHA-1 ANTITRYPSIN PHENOTYPE: NORMAL
ALPHA-1 ANTITRYPSIN: 166 MG/DL (ref 90–200)

## 2023-12-15 NOTE — TELEPHONE ENCOUNTER
----- Message from NEHEMIAS Molina CNP sent at 12/15/2023  8:34 AM EST -----  Please notify patient that their lab results are normal.

## 2023-12-16 LAB
GLIADIN IGA SER IA-ACNC: 1.2 U/ML
GLIADIN IGG SER IA-ACNC: <0.4 U/ML
IGA SERPL-MCNC: 107 MG/DL (ref 70–400)
MITOCHONDRIA M2 IGG SER-ACNC: 0.9 U/ML (ref 0–4)
TTG IGA SER IA-ACNC: <0.1 U/ML

## 2024-01-18 ENCOUNTER — HOSPITAL ENCOUNTER (OUTPATIENT)
Age: 74
Setting detail: SPECIMEN
Discharge: HOME | End: 2024-01-18

## 2024-01-18 ENCOUNTER — OFFICE VISIT (OUTPATIENT)
Dept: GASTROENTEROLOGY | Age: 74
End: 2024-01-18
Payer: MEDICARE

## 2024-01-18 VITALS
SYSTOLIC BLOOD PRESSURE: 128 MMHG | RESPIRATION RATE: 18 BRPM | BODY MASS INDEX: 34.46 KG/M2 | OXYGEN SATURATION: 97 % | WEIGHT: 214.4 LBS | HEIGHT: 66 IN | HEART RATE: 78 BPM | DIASTOLIC BLOOD PRESSURE: 74 MMHG

## 2024-01-18 DIAGNOSIS — R74.8 ELEVATED LIVER ENZYMES: ICD-10-CM

## 2024-01-18 DIAGNOSIS — K76.0 HEPATIC STEATOSIS: Primary | ICD-10-CM

## 2024-01-18 DIAGNOSIS — K76.0 HEPATIC STEATOSIS: ICD-10-CM

## 2024-01-18 PROCEDURE — 1123F ACP DISCUSS/DSCN MKR DOCD: CPT | Performed by: NURSE PRACTITIONER

## 2024-01-18 PROCEDURE — 3074F SYST BP LT 130 MM HG: CPT | Performed by: NURSE PRACTITIONER

## 2024-01-18 PROCEDURE — 1036F TOBACCO NON-USER: CPT | Performed by: NURSE PRACTITIONER

## 2024-01-18 PROCEDURE — G8427 DOCREV CUR MEDS BY ELIG CLIN: HCPCS | Performed by: NURSE PRACTITIONER

## 2024-01-18 PROCEDURE — G8484 FLU IMMUNIZE NO ADMIN: HCPCS | Performed by: NURSE PRACTITIONER

## 2024-01-18 PROCEDURE — 3078F DIAST BP <80 MM HG: CPT | Performed by: NURSE PRACTITIONER

## 2024-01-18 PROCEDURE — G8417 CALC BMI ABV UP PARAM F/U: HCPCS | Performed by: NURSE PRACTITIONER

## 2024-01-18 PROCEDURE — 99213 OFFICE O/P EST LOW 20 MIN: CPT | Performed by: NURSE PRACTITIONER

## 2024-01-18 PROCEDURE — 3017F COLORECTAL CA SCREEN DOC REV: CPT | Performed by: NURSE PRACTITIONER

## 2024-01-18 NOTE — PATIENT INSTRUCTIONS
SURVEY:    You may be receiving a [facility].    You may get this in the mail, through your MyChart, or in your email.     Please complete the survey to enable us to provide the highest quality of care to you and your family.    If you cannot score us a very good (5 Stars) on any question, please call the office to discuss how we could of made your experience exceptional.    Thank you!    MD Liz Padilla, APRN-RAFFI Gomez LPN Michelle, LPN    Phone: [phone]  Fax: [phone]    Office Hours:   M-TH 8-5, F: 8-12

## 2024-01-18 NOTE — PROGRESS NOTES
[address] [address]    Chief Complaint   Patient presents with    Results     Patient presents for 6 week follow up for Hepatic steatosis. Patient reports he is doing well. He has completed all lab work ordered at last visit. He is here to discuss results.         HPI Daryl Dunn is a 73 y.o. old male who has a past medical history of hearing loss (wears hearing aids), hypercholesteremia (on statins), hypertension, prediabetes, history of CVA presenting with concern for elevated liver enzymes, hepatic steatosis and cholelithiasis on recent liver sonogram.  He is accompanied by his wife, Ashley who assist with HPI.  According to Daryl and Ashley, over the last 6 months he has began to have elevated liver enzymes on blood work.  His PCP further evaluated liver sonogram returning results of hepatic steatosis and cholelithiasis. No prior Liver biopsy noted.    According to Daryl, he has not had any symptoms of gallbladder disease. No hx of juandice, pruritis, easy bruising or admission for liver related conditions.  Daryl reports himself as an alcohol drinker with 1 glass of white wine 5 times a week.  Denies heavy alcohol use.  Denies past history of hepatitis.  No FHX of Liver diseases or cirrhosis noted.  Today is to review labs and elastography.    Past Surgical History:   Procedure Laterality Date    COLONOSCOPY  2012    COLONOSCOPY N/A 03/08/2023    COLONOSCOPY POLYPECTOMY REMOVAL HOT SNARE / COLD BIOPSIES performed by Kendra Cruz MD at [facility] OR    COLONOSCOPY  03/08/2023    -polyps(tubular adenomas,hyperplastic)diverticulossis    ESOPHAGOGASTRODUODENOSCOPY  2012    ESOPHAGOGASTRODUODENOSCOPY  03/08/2023    -bx's,hiatal hernia    PROSTATE SURGERY      SKIN CANCER DESTRUCTION  01/01/2008    SKIN CANCER EXCISION  01/01/2008    UPPER GASTROINTESTINAL ENDOSCOPY N/A 03/08/2023    EGD BIOPSY performed by Kendra Cruz MD at [facility] OR         Current Outpatient

## 2024-01-24 LAB
C282Y HEMOCHROMATOSIS MUT: NEGATIVE
H63D HEMOCHROMATOSIS MUT: NEGATIVE
HEMOCHROMATOSIS MUTATION INT: NORMAL
HEMOCHROMATOSIS SPECIMEN: NORMAL
S65C HEMOCHROMATOSIS MUT: NEGATIVE

## 2024-01-25 ENCOUNTER — TELEPHONE (OUTPATIENT)
Dept: SURGERY | Age: 74
End: 2024-01-25

## 2024-01-25 NOTE — TELEPHONE ENCOUNTER
----- Message from NEHEMIAS Cormier CNP sent at 1/25/2024  9:01 AM EST -----  Please notify Deric that his hemochromatosis returned negative.

## 2024-05-21 ENCOUNTER — HOSPITAL ENCOUNTER (OUTPATIENT)
Age: 74
Setting detail: SPECIMEN
Discharge: HOME | End: 2024-05-21

## 2024-05-21 DIAGNOSIS — E78.5 HYPERLIPIDEMIA, UNSPECIFIED HYPERLIPIDEMIA TYPE: ICD-10-CM

## 2024-05-21 DIAGNOSIS — Z12.5 ENCOUNTER FOR SCREENING FOR MALIGNANT NEOPLASM OF PROSTATE: ICD-10-CM

## 2024-05-21 DIAGNOSIS — R79.9 ABNORMAL FINDING OF BLOOD CHEMISTRY, UNSPECIFIED: ICD-10-CM

## 2024-05-21 DIAGNOSIS — I10 ESSENTIAL HYPERTENSION: ICD-10-CM

## 2024-05-21 LAB
ALBUMIN SERPL-MCNC: 4.7 G/DL (ref 3.5–5.2)
ALBUMIN/GLOB SERPL: 1 {RATIO} (ref 1–2.5)
ALP SERPL-CCNC: 52 U/L (ref 40–129)
ALT SERPL-CCNC: 64 U/L (ref 10–50)
ANION GAP SERPL CALCULATED.3IONS-SCNC: 11 MMOL/L (ref 9–16)
AST SERPL-CCNC: 45 U/L (ref 10–50)
BILIRUB SERPL-MCNC: 0.5 MG/DL (ref 0–1.2)
BUN SERPL-MCNC: 15 MG/DL (ref 8–23)
CALCIUM SERPL-MCNC: 10.2 MG/DL (ref 8.6–10.4)
CHLORIDE SERPL-SCNC: 100 MMOL/L (ref 98–107)
CO2 SERPL-SCNC: 27 MMOL/L (ref 20–31)
CREAT SERPL-MCNC: 1.1 MG/DL (ref 0.7–1.2)
EST. AVERAGE GLUCOSE BLD GHB EST-MCNC: 131 MG/DL
GFR, ESTIMATED: 74 ML/MIN/1.73M2
GLUCOSE SERPL-MCNC: 119 MG/DL (ref 74–99)
HBA1C MFR BLD: 6.2 % (ref 4–6)
POTASSIUM SERPL-SCNC: 4.2 MMOL/L (ref 3.7–5.3)
PROT SERPL-MCNC: 7.9 G/DL (ref 6.6–8.7)
PSA SERPL-MCNC: 0.5 NG/ML (ref 0–4)
SODIUM SERPL-SCNC: 138 MMOL/L (ref 136–145)

## 2024-11-29 ENCOUNTER — HOSPITAL ENCOUNTER (OUTPATIENT)
Age: 74
Discharge: HOME | End: 2024-11-29
Payer: MEDICARE

## 2024-11-29 DIAGNOSIS — Z51.81 ENCOUNTER FOR THERAPEUTIC DRUG LEVEL MONITORING: ICD-10-CM

## 2024-11-29 DIAGNOSIS — G47.00 INSOMNIA, UNSPECIFIED TYPE: ICD-10-CM

## 2024-11-29 LAB
AMPHET UR QL SCN: NEGATIVE
BARBITURATES UR QL SCN: NEGATIVE
BENZODIAZ UR QL: NEGATIVE
BUPRENORPHINE UR QL: NEGATIVE
CANNABINOIDS UR QL SCN: NEGATIVE
COCAINE UR QL SCN: NEGATIVE
FENTANYL UR QL: NEGATIVE
METHADONE UR QL: NEGATIVE
OPIATES UR QL SCN: NEGATIVE
OXYCODONE UR QL SCN: NEGATIVE
PCP UR QL SCN: NEGATIVE
TEST INFORMATION: NORMAL

## 2024-11-29 PROCEDURE — 80307 DRUG TEST PRSMV CHEM ANLYZR: CPT

## 2024-11-29 PROCEDURE — 36415 COLL VENOUS BLD VENIPUNCTURE: CPT

## 2025-06-08 ENCOUNTER — APPOINTMENT (OUTPATIENT)
Dept: GENERAL RADIOLOGY | Age: 75
End: 2025-06-08
Payer: MEDICARE

## 2025-06-08 ENCOUNTER — HOSPITAL ENCOUNTER (EMERGENCY)
Age: 75
Discharge: HOME | End: 2025-06-08
Payer: MEDICARE

## 2025-06-08 VITALS
RESPIRATION RATE: 22 BRPM | HEART RATE: 62 BPM | TEMPERATURE: 98.5 F | SYSTOLIC BLOOD PRESSURE: 136 MMHG | DIASTOLIC BLOOD PRESSURE: 68 MMHG | OXYGEN SATURATION: 97 %

## 2025-06-08 DIAGNOSIS — K44.9 HIATAL HERNIA: Primary | ICD-10-CM

## 2025-06-08 DIAGNOSIS — K21.00 GASTROESOPHAGEAL REFLUX DISEASE WITH ESOPHAGITIS WITHOUT HEMORRHAGE: ICD-10-CM

## 2025-06-08 LAB
ALBUMIN SERPL-MCNC: 4.7 G/DL (ref 3.5–5.2)
ALBUMIN/GLOB SERPL: 1.5 {RATIO} (ref 1–2.5)
ALP SERPL-CCNC: 46 U/L (ref 40–129)
ALT SERPL-CCNC: 41 U/L (ref 10–50)
ANION GAP SERPL CALCULATED.3IONS-SCNC: 14 MMOL/L (ref 9–16)
AST SERPL-CCNC: 33 U/L (ref 10–50)
BASOPHILS # BLD: 0.03 K/UL (ref 0–0.2)
BASOPHILS NFR BLD: 0 % (ref 0–2)
BILIRUB SERPL-MCNC: 0.5 MG/DL (ref 0–1.2)
BUN SERPL-MCNC: 16 MG/DL (ref 8–23)
BUN/CREAT SERPL: 16 (ref 9–20)
CALCIUM SERPL-MCNC: 9.6 MG/DL (ref 8.6–10.4)
CHLORIDE SERPL-SCNC: 99 MMOL/L (ref 98–107)
CO2 SERPL-SCNC: 23 MMOL/L (ref 20–31)
CREAT SERPL-MCNC: 1 MG/DL (ref 0.7–1.2)
EOSINOPHIL # BLD: 0.04 K/UL (ref 0–0.44)
EOSINOPHILS RELATIVE PERCENT: 0 % (ref 1–4)
ERYTHROCYTE [DISTWIDTH] IN BLOOD BY AUTOMATED COUNT: 12.7 % (ref 11.8–14.4)
GFR, ESTIMATED: 84 ML/MIN/1.73M2
GLUCOSE SERPL-MCNC: 200 MG/DL (ref 74–99)
HCT VFR BLD AUTO: 43.1 % (ref 40.7–50.3)
HGB BLD-MCNC: 14.9 G/DL (ref 13–17)
IMM GRANULOCYTES # BLD AUTO: 0.04 K/UL (ref 0–0.3)
IMM GRANULOCYTES NFR BLD: 0 %
LIPASE SERPL-CCNC: 42 U/L (ref 13–60)
LYMPHOCYTES NFR BLD: 0.65 K/UL (ref 1.1–3.7)
LYMPHOCYTES RELATIVE PERCENT: 6 % (ref 24–43)
MCH RBC QN AUTO: 30.8 PG (ref 25.2–33.5)
MCHC RBC AUTO-ENTMCNC: 34.6 G/DL (ref 28.4–34.8)
MCV RBC AUTO: 89 FL (ref 82.6–102.9)
MONOCYTES NFR BLD: 0.68 K/UL (ref 0.1–1.2)
MONOCYTES NFR BLD: 6 % (ref 3–12)
NEUTROPHILS NFR BLD: 88 % (ref 36–65)
NEUTS SEG NFR BLD: 9.5 K/UL (ref 1.5–8.1)
NRBC BLD-RTO: 0 PER 100 WBC
PLATELET # BLD AUTO: 182 K/UL (ref 138–453)
PMV BLD AUTO: 8.7 FL (ref 8.1–13.5)
POTASSIUM SERPL-SCNC: 3.9 MMOL/L (ref 3.7–5.3)
PROT SERPL-MCNC: 7.9 G/DL (ref 6.6–8.7)
RBC # BLD AUTO: 4.84 M/UL (ref 4.21–5.77)
SODIUM SERPL-SCNC: 136 MMOL/L (ref 136–145)
SPECIMEN SOURCE: NORMAL
STREP A, MOLECULAR: NEGATIVE
TROPONIN I SERPL HS-MCNC: 18 NG/L (ref 0–22)
WBC OTHER # BLD: 10.9 K/UL (ref 3.5–11.3)

## 2025-06-08 PROCEDURE — 71045 X-RAY EXAM CHEST 1 VIEW: CPT

## 2025-06-08 PROCEDURE — 99285 EMERGENCY DEPT VISIT HI MDM: CPT

## 2025-06-08 PROCEDURE — 85025 COMPLETE CBC W/AUTO DIFF WBC: CPT

## 2025-06-08 PROCEDURE — 83690 ASSAY OF LIPASE: CPT

## 2025-06-08 PROCEDURE — 6370000000 HC RX 637 (ALT 250 FOR IP)

## 2025-06-08 PROCEDURE — 93005 ELECTROCARDIOGRAM TRACING: CPT

## 2025-06-08 PROCEDURE — 80053 COMPREHEN METABOLIC PANEL: CPT

## 2025-06-08 PROCEDURE — 87651 STREP A DNA AMP PROBE: CPT

## 2025-06-08 PROCEDURE — 84484 ASSAY OF TROPONIN QUANT: CPT

## 2025-06-08 RX ORDER — LIDOCAINE HYDROCHLORIDE 20 MG/ML
10 SOLUTION OROPHARYNGEAL
Qty: 100 ML | Refills: 0 | Status: SHIPPED | OUTPATIENT
Start: 2025-06-08

## 2025-06-08 RX ORDER — ALUMINUM HYDROXIDE, MAGNESIUM HYDROXIDE, SIMETHICONE 400; 400; 40 MG/10ML; MG/10ML; MG/10ML
5 SUSPENSION ORAL 3 TIMES DAILY PRN
Qty: 355 ML | Refills: 0 | Status: SHIPPED | OUTPATIENT
Start: 2025-06-08

## 2025-06-08 RX ORDER — SUCRALFATE 1 G/1
1 TABLET ORAL ONCE
Status: COMPLETED | OUTPATIENT
Start: 2025-06-08 | End: 2025-06-08

## 2025-06-08 RX ORDER — SUCRALFATE 1 G/1
1 TABLET ORAL 3 TIMES DAILY PRN
Qty: 30 TABLET | Refills: 3 | Status: SHIPPED | OUTPATIENT
Start: 2025-06-08

## 2025-06-08 RX ORDER — ONDANSETRON 4 MG/1
4 TABLET, ORALLY DISINTEGRATING ORAL ONCE
Status: COMPLETED | OUTPATIENT
Start: 2025-06-08 | End: 2025-06-08

## 2025-06-08 RX ADMIN — LIDOCAINE HYDROCHLORIDE: 20 SOLUTION ORAL at 13:41

## 2025-06-08 RX ADMIN — SUCRALFATE 1 G: 1 TABLET ORAL at 13:41

## 2025-06-08 RX ADMIN — ONDANSETRON 4 MG: 4 TABLET, ORALLY DISINTEGRATING ORAL at 13:41

## 2025-06-08 ASSESSMENT — PAIN SCALES - GENERAL: PAINLEVEL_OUTOF10: 2

## 2025-06-08 NOTE — ED PROVIDER NOTES
[facility] EMERGENCY DEPARTMENT  Emergency Department Encounter  Emergency Medicine Attending     Pt Name:Daryl Dunn  MRN: [id number]  Birthdate 1950  Date of evaluation: 6/8/25  PCP:  Carlos A Martinez APRN - CNP  Note Started: 6:40 PM EDT      CHIEF COMPLAINT       Chief Complaint   Patient presents with    Pharyngitis     Onset a \"few days ado\"    Abdominal Pain     Stating radiation from sore throat to stomach       HISTORY OF PRESENT ILLNESS  (Location/Symptom, Timing/Onset, Context/Setting, Quality, Duration, Modifying Factors, Severity.)      75-year-old male presents for evaluation of throat pain and GERD.  Patient states since yesterday he has been experiencing burning from his esophagus and he has a sore throat.  No fevers or chills.  No voice change or difficulty breathing.  Patient has known history of hiatal hernia.  Denies any current chest pain or shortness of breath.            PAST MEDICAL / SURGICAL / SOCIAL / FAMILY HISTORY      has a past medical history of Cancer (HCC), GERD (gastroesophageal reflux disease), Hearing loss, High cholesterol, Hypertension, Prediabetes, Stroke (HCC), and Unspecified cerebral artery occlusion with cerebral infarction.     has a past surgical history that includes Skin cancer excision (01/01/2008); Skin cancer destruction (01/01/2008); Prostate surgery; Colonoscopy (2012); Esophagogastroduodenoscopy (2012); Colonoscopy (N/A, 03/08/2023); Upper gastrointestinal endoscopy (N/A, 03/08/2023); Esophagogastroduodenoscopy (03/08/2023); and Colonoscopy (03/08/2023).    Social History     Socioeconomic History    Marital status:      Spouse name: Not on file    Number of children: Not on file    Years of education: Not on file    Highest education level: Not on file   Occupational History    Not on file   Tobacco Use    Smoking status: Former     Current packs/day: 1.00     Average packs/day: 1 pack/day for 21.8 years (21.8 ttl pk-yrs)     Types:

## 2025-06-09 LAB
EKG ATRIAL RATE: 73 BPM
EKG P AXIS: 75 DEGREES
EKG P-R INTERVAL: 266 MS
EKG Q-T INTERVAL: 370 MS
EKG QRS DURATION: 96 MS
EKG QTC CALCULATION (BAZETT): 407 MS
EKG R AXIS: 6 DEGREES
EKG T AXIS: 84 DEGREES
EKG VENTRICULAR RATE: 73 BPM

## 2025-06-09 PROCEDURE — 93010 ELECTROCARDIOGRAM REPORT: CPT | Performed by: FAMILY MEDICINE

## 2025-06-10 ENCOUNTER — HOSPITAL ENCOUNTER (OUTPATIENT)
Age: 75
Discharge: HOME | End: 2025-06-10
Payer: MEDICARE

## 2025-06-10 DIAGNOSIS — E78.5 HYPERLIPIDEMIA, UNSPECIFIED HYPERLIPIDEMIA TYPE: ICD-10-CM

## 2025-06-10 DIAGNOSIS — Z12.5 SCREENING FOR PROSTATE CANCER: ICD-10-CM

## 2025-06-10 DIAGNOSIS — I10 ESSENTIAL HYPERTENSION: ICD-10-CM

## 2025-06-10 DIAGNOSIS — R79.9 ABNORMAL FINDING OF BLOOD CHEMISTRY, UNSPECIFIED: ICD-10-CM

## 2025-06-10 LAB
ALBUMIN SERPL-MCNC: 4.5 G/DL (ref 3.5–5.2)
ALBUMIN/GLOB SERPL: 1.4 {RATIO} (ref 1–2.5)
ALP SERPL-CCNC: 46 U/L (ref 40–129)
ALT SERPL-CCNC: 52 U/L (ref 10–50)
ANION GAP SERPL CALCULATED.3IONS-SCNC: 11 MMOL/L (ref 9–16)
AST SERPL-CCNC: 89 U/L (ref 10–50)
BILIRUB SERPL-MCNC: 0.4 MG/DL (ref 0–1.2)
BUN SERPL-MCNC: 16 MG/DL (ref 8–23)
BUN/CREAT SERPL: 15 (ref 9–20)
CALCIUM SERPL-MCNC: 9.8 MG/DL (ref 8.6–10.4)
CHLORIDE SERPL-SCNC: 101 MMOL/L (ref 98–107)
CO2 SERPL-SCNC: 26 MMOL/L (ref 20–31)
CREAT SERPL-MCNC: 1.1 MG/DL (ref 0.7–1.2)
EST. AVERAGE GLUCOSE BLD GHB EST-MCNC: 128 MG/DL
GFR, ESTIMATED: 70 ML/MIN/1.73M2
GLUCOSE SERPL-MCNC: 137 MG/DL (ref 74–99)
HBA1C MFR BLD: 6.1 % (ref 4–6)
POTASSIUM SERPL-SCNC: 3.8 MMOL/L (ref 3.7–5.3)
PROT SERPL-MCNC: 7.6 G/DL (ref 6.6–8.7)
PSA SERPL-MCNC: 0.4 NG/ML (ref 0–4)
SODIUM SERPL-SCNC: 138 MMOL/L (ref 136–145)

## 2025-06-10 PROCEDURE — 36415 COLL VENOUS BLD VENIPUNCTURE: CPT

## 2025-06-10 PROCEDURE — G0103 PSA SCREENING: HCPCS

## 2025-06-10 PROCEDURE — 80053 COMPREHEN METABOLIC PANEL: CPT

## 2025-06-10 PROCEDURE — 83036 HEMOGLOBIN GLYCOSYLATED A1C: CPT

## (undated) DEVICE — FORCEP SPEC RETRV BX AD 2 MMX155 CM 5 MM GI OVL CUP W/ NDL

## (undated) DEVICE — TUBING SUCT NON-STRL 9/32X100 W/CNNT

## (undated) DEVICE — TRAP SURG QUAD PARABOLA SLOT DSGN SFTY SCRN TRAPEASE

## (undated) DEVICE — ACUSNARE POLYPECTOMY SNARE: Brand: ACUSNARE

## (undated) DEVICE — SOLUTION IV IRRIG POUR BRL 0.9% SODIUM CHL 2F7124

## (undated) DEVICE — CANNULA ORAL NSL AD CO2 N INTUB O2 DEL DISP TRU LNK

## (undated) DEVICE — PAD ADH AD ELECTRD 2 PLT W 5M CRD